# Patient Record
Sex: FEMALE | Race: WHITE | Employment: FULL TIME | URBAN - METROPOLITAN AREA
[De-identification: names, ages, dates, MRNs, and addresses within clinical notes are randomized per-mention and may not be internally consistent; named-entity substitution may affect disease eponyms.]

---

## 2019-03-11 ENCOUNTER — APPOINTMENT (OUTPATIENT)
Dept: CT IMAGING | Age: 59
DRG: 552 | End: 2019-03-11
Payer: COMMERCIAL

## 2019-03-11 ENCOUNTER — HOSPITAL ENCOUNTER (INPATIENT)
Age: 59
LOS: 2 days | Discharge: HOME | DRG: 552 | End: 2019-03-13
Attending: EMERGENCY MEDICINE | Admitting: INTERNAL MEDICINE
Payer: COMMERCIAL

## 2019-03-11 ENCOUNTER — APPOINTMENT (OUTPATIENT)
Dept: MRI IMAGING | Age: 59
DRG: 552 | End: 2019-03-11
Payer: COMMERCIAL

## 2019-03-11 ENCOUNTER — APPOINTMENT (OUTPATIENT)
Dept: GENERAL RADIOLOGY | Age: 59
DRG: 552 | End: 2019-03-11
Payer: COMMERCIAL

## 2019-03-11 DIAGNOSIS — S12.100A TRAUMATIC CLOSED FRACTURE OF C2 VERTEBRA WITH MINIMAL DISPLACEMENT, INITIAL ENCOUNTER (HCC): ICD-10-CM

## 2019-03-11 DIAGNOSIS — S09.90XA INJURY OF HEAD, INITIAL ENCOUNTER: ICD-10-CM

## 2019-03-11 DIAGNOSIS — S62.101A RIGHT WRIST FRACTURE, CLOSED, INITIAL ENCOUNTER: Primary | ICD-10-CM

## 2019-03-11 DIAGNOSIS — S00.81XA FOREHEAD ABRASION, INITIAL ENCOUNTER: ICD-10-CM

## 2019-03-11 PROBLEM — S12.101A CLOSED NONDISPLACED FRACTURE OF SECOND CERVICAL VERTEBRA (HCC): Status: ACTIVE | Noted: 2019-03-11

## 2019-03-11 LAB
ANION GAP SERPL CALCULATED.3IONS-SCNC: 11 MMOL/L (ref 3–16)
BUN BLDV-MCNC: 14 MG/DL (ref 7–20)
CALCIUM SERPL-MCNC: 9.8 MG/DL (ref 8.3–10.6)
CHLORIDE BLD-SCNC: 98 MMOL/L (ref 99–110)
CO2: 29 MMOL/L (ref 21–32)
CREAT SERPL-MCNC: 0.6 MG/DL (ref 0.6–1.1)
GFR AFRICAN AMERICAN: >60
GFR NON-AFRICAN AMERICAN: >60
GLUCOSE BLD-MCNC: 122 MG/DL (ref 70–99)
POTASSIUM SERPL-SCNC: 3.9 MMOL/L (ref 3.5–5.1)
SODIUM BLD-SCNC: 138 MMOL/L (ref 136–145)

## 2019-03-11 PROCEDURE — 72141 MRI NECK SPINE W/O DYE: CPT

## 2019-03-11 PROCEDURE — 6360000004 HC RX CONTRAST MEDICATION: Performed by: EMERGENCY MEDICINE

## 2019-03-11 PROCEDURE — 99285 EMERGENCY DEPT VISIT HI MDM: CPT

## 2019-03-11 PROCEDURE — 80048 BASIC METABOLIC PNL TOTAL CA: CPT

## 2019-03-11 PROCEDURE — 6370000000 HC RX 637 (ALT 250 FOR IP): Performed by: FAMILY MEDICINE

## 2019-03-11 PROCEDURE — 73110 X-RAY EXAM OF WRIST: CPT

## 2019-03-11 PROCEDURE — 72125 CT NECK SPINE W/O DYE: CPT

## 2019-03-11 PROCEDURE — 90471 IMMUNIZATION ADMIN: CPT | Performed by: EMERGENCY MEDICINE

## 2019-03-11 PROCEDURE — 70450 CT HEAD/BRAIN W/O DYE: CPT

## 2019-03-11 PROCEDURE — 6360000002 HC RX W HCPCS: Performed by: INTERNAL MEDICINE

## 2019-03-11 PROCEDURE — 2580000003 HC RX 258: Performed by: NURSE PRACTITIONER

## 2019-03-11 PROCEDURE — 6370000000 HC RX 637 (ALT 250 FOR IP): Performed by: EMERGENCY MEDICINE

## 2019-03-11 PROCEDURE — 96375 TX/PRO/DX INJ NEW DRUG ADDON: CPT

## 2019-03-11 PROCEDURE — 90715 TDAP VACCINE 7 YRS/> IM: CPT | Performed by: EMERGENCY MEDICINE

## 2019-03-11 PROCEDURE — 6370000000 HC RX 637 (ALT 250 FOR IP): Performed by: INTERNAL MEDICINE

## 2019-03-11 PROCEDURE — 6370000000 HC RX 637 (ALT 250 FOR IP): Performed by: NURSE PRACTITIONER

## 2019-03-11 PROCEDURE — 1200000000 HC SEMI PRIVATE

## 2019-03-11 PROCEDURE — 70498 CT ANGIOGRAPHY NECK: CPT

## 2019-03-11 PROCEDURE — 2580000003 HC RX 258: Performed by: INTERNAL MEDICINE

## 2019-03-11 PROCEDURE — 4500000025 HC ED LEVEL 5 PROCEDURE

## 2019-03-11 PROCEDURE — 6360000002 HC RX W HCPCS: Performed by: NURSE PRACTITIONER

## 2019-03-11 PROCEDURE — 6360000002 HC RX W HCPCS: Performed by: EMERGENCY MEDICINE

## 2019-03-11 PROCEDURE — 96365 THER/PROPH/DIAG IV INF INIT: CPT

## 2019-03-11 PROCEDURE — 70496 CT ANGIOGRAPHY HEAD: CPT

## 2019-03-11 RX ORDER — IBUPROFEN 400 MG/1
800 TABLET ORAL ONCE
Status: COMPLETED | OUTPATIENT
Start: 2019-03-11 | End: 2019-03-11

## 2019-03-11 RX ORDER — OXYCODONE HYDROCHLORIDE AND ACETAMINOPHEN 5; 325 MG/1; MG/1
1 TABLET ORAL EVERY 4 HOURS PRN
Status: DISCONTINUED | OUTPATIENT
Start: 2019-03-11 | End: 2019-03-11

## 2019-03-11 RX ORDER — ONDANSETRON 2 MG/ML
4 INJECTION INTRAMUSCULAR; INTRAVENOUS ONCE
Status: COMPLETED | OUTPATIENT
Start: 2019-03-11 | End: 2019-03-11

## 2019-03-11 RX ORDER — LABETALOL HYDROCHLORIDE 5 MG/ML
10 INJECTION, SOLUTION INTRAVENOUS EVERY 4 HOURS PRN
Status: DISCONTINUED | OUTPATIENT
Start: 2019-03-11 | End: 2019-03-13 | Stop reason: HOSPADM

## 2019-03-11 RX ORDER — UREA 10 %
3 LOTION (ML) TOPICAL NIGHTLY PRN
Status: DISCONTINUED | OUTPATIENT
Start: 2019-03-11 | End: 2019-03-13 | Stop reason: HOSPADM

## 2019-03-11 RX ORDER — SODIUM CHLORIDE 0.9 % (FLUSH) 0.9 %
10 SYRINGE (ML) INJECTION PRN
Status: DISCONTINUED | OUTPATIENT
Start: 2019-03-11 | End: 2019-03-13 | Stop reason: HOSPADM

## 2019-03-11 RX ORDER — ACETAMINOPHEN 325 MG/1
650 TABLET ORAL EVERY 4 HOURS PRN
Status: DISCONTINUED | OUTPATIENT
Start: 2019-03-11 | End: 2019-03-13 | Stop reason: HOSPADM

## 2019-03-11 RX ORDER — SODIUM CHLORIDE 0.9 % (FLUSH) 0.9 %
10 SYRINGE (ML) INJECTION EVERY 12 HOURS SCHEDULED
Status: DISCONTINUED | OUTPATIENT
Start: 2019-03-11 | End: 2019-03-13 | Stop reason: HOSPADM

## 2019-03-11 RX ORDER — SODIUM CHLORIDE 9 MG/ML
INJECTION, SOLUTION INTRAVENOUS CONTINUOUS
Status: DISCONTINUED | OUTPATIENT
Start: 2019-03-11 | End: 2019-03-12

## 2019-03-11 RX ORDER — MORPHINE SULFATE 2 MG/ML
4 INJECTION, SOLUTION INTRAMUSCULAR; INTRAVENOUS EVERY 4 HOURS PRN
Status: DISCONTINUED | OUTPATIENT
Start: 2019-03-11 | End: 2019-03-13 | Stop reason: HOSPADM

## 2019-03-11 RX ORDER — MORPHINE SULFATE 4 MG/ML
4 INJECTION, SOLUTION INTRAMUSCULAR; INTRAVENOUS ONCE
Status: COMPLETED | OUTPATIENT
Start: 2019-03-11 | End: 2019-03-11

## 2019-03-11 RX ORDER — ONDANSETRON 2 MG/ML
4 INJECTION INTRAMUSCULAR; INTRAVENOUS EVERY 6 HOURS PRN
Status: DISCONTINUED | OUTPATIENT
Start: 2019-03-11 | End: 2019-03-13 | Stop reason: HOSPADM

## 2019-03-11 RX ORDER — METHOCARBAMOL 500 MG/1
1000 TABLET, FILM COATED ORAL 4 TIMES DAILY
Status: DISCONTINUED | OUTPATIENT
Start: 2019-03-11 | End: 2019-03-13 | Stop reason: HOSPADM

## 2019-03-11 RX ORDER — OXYCODONE HYDROCHLORIDE AND ACETAMINOPHEN 5; 325 MG/1; MG/1
1 TABLET ORAL EVERY 4 HOURS PRN
Status: DISCONTINUED | OUTPATIENT
Start: 2019-03-11 | End: 2019-03-13 | Stop reason: HOSPADM

## 2019-03-11 RX ORDER — DULOXETIN HYDROCHLORIDE 20 MG/1
20 CAPSULE, DELAYED RELEASE ORAL DAILY
COMMUNITY

## 2019-03-11 RX ORDER — M-VIT,TX,IRON,MINS/CALC/FOLIC 27MG-0.4MG
1 TABLET ORAL DAILY
COMMUNITY

## 2019-03-11 RX ORDER — OXYCODONE HYDROCHLORIDE AND ACETAMINOPHEN 5; 325 MG/1; MG/1
2 TABLET ORAL EVERY 4 HOURS PRN
Status: DISCONTINUED | OUTPATIENT
Start: 2019-03-11 | End: 2019-03-13 | Stop reason: HOSPADM

## 2019-03-11 RX ADMIN — IBUPROFEN 800 MG: 400 TABLET, FILM COATED ORAL at 11:53

## 2019-03-11 RX ADMIN — ONDANSETRON 4 MG: 2 INJECTION INTRAMUSCULAR; INTRAVENOUS at 12:52

## 2019-03-11 RX ADMIN — MORPHINE SULFATE 4 MG: 2 INJECTION, SOLUTION INTRAMUSCULAR; INTRAVENOUS at 18:28

## 2019-03-11 RX ADMIN — SODIUM CHLORIDE: 9 INJECTION, SOLUTION INTRAVENOUS at 18:58

## 2019-03-11 RX ADMIN — METHOCARBAMOL TABLETS 1000 MG: 500 TABLET, COATED ORAL at 21:11

## 2019-03-11 RX ADMIN — MORPHINE SULFATE 4 MG: 4 INJECTION, SOLUTION INTRAMUSCULAR; INTRAVENOUS at 12:52

## 2019-03-11 RX ADMIN — OXYCODONE AND ACETAMINOPHEN 1 TABLET: 5; 325 TABLET ORAL at 15:49

## 2019-03-11 RX ADMIN — OXYCODONE AND ACETAMINOPHEN 1 TABLET: 5; 325 TABLET ORAL at 22:46

## 2019-03-11 RX ADMIN — OXYCODONE HYDROCHLORIDE AND ACETAMINOPHEN 1 TABLET: 5; 325 TABLET ORAL at 23:12

## 2019-03-11 RX ADMIN — IOPAMIDOL 80 ML: 755 INJECTION, SOLUTION INTRAVENOUS at 14:12

## 2019-03-11 RX ADMIN — METHOCARBAMOL 1000 MG: 100 INJECTION, SOLUTION INTRAMUSCULAR; INTRAVENOUS at 14:29

## 2019-03-11 RX ADMIN — ONDANSETRON 4 MG: 2 INJECTION INTRAMUSCULAR; INTRAVENOUS at 18:28

## 2019-03-11 ASSESSMENT — PAIN DESCRIPTION - ORIENTATION
ORIENTATION: MID
ORIENTATION: RIGHT
ORIENTATION: MID

## 2019-03-11 ASSESSMENT — PAIN SCALES - GENERAL
PAINLEVEL_OUTOF10: 3
PAINLEVEL_OUTOF10: 10
PAINLEVEL_OUTOF10: 10
PAINLEVEL_OUTOF10: 7
PAINLEVEL_OUTOF10: 10
PAINLEVEL_OUTOF10: 3
PAINLEVEL_OUTOF10: 7
PAINLEVEL_OUTOF10: 10
PAINLEVEL_OUTOF10: 6
PAINLEVEL_OUTOF10: 5

## 2019-03-11 ASSESSMENT — PAIN DESCRIPTION - FREQUENCY
FREQUENCY: CONTINUOUS

## 2019-03-11 ASSESSMENT — PAIN DESCRIPTION - LOCATION
LOCATION: NECK

## 2019-03-11 ASSESSMENT — PAIN DESCRIPTION - PAIN TYPE
TYPE: ACUTE PAIN

## 2019-03-11 ASSESSMENT — PAIN DESCRIPTION - ONSET
ONSET: ON-GOING
ONSET: SUDDEN
ONSET: ON-GOING
ONSET: ON-GOING

## 2019-03-11 ASSESSMENT — PAIN DESCRIPTION - DESCRIPTORS
DESCRIPTORS: ACHING
DESCRIPTORS: ACHING;CONSTANT
DESCRIPTORS: CONSTANT
DESCRIPTORS: ACHING;CONSTANT

## 2019-03-11 ASSESSMENT — PAIN DESCRIPTION - PROGRESSION
CLINICAL_PROGRESSION: NOT CHANGED
CLINICAL_PROGRESSION: NOT CHANGED
CLINICAL_PROGRESSION: GRADUALLY WORSENING
CLINICAL_PROGRESSION: GRADUALLY WORSENING

## 2019-03-11 ASSESSMENT — PAIN DESCRIPTION - DIRECTION: RADIATING_TOWARDS: HEAD

## 2019-03-11 ASSESSMENT — ENCOUNTER SYMPTOMS
RESPIRATORY NEGATIVE: 1
GASTROINTESTINAL NEGATIVE: 1

## 2019-03-12 ENCOUNTER — APPOINTMENT (OUTPATIENT)
Dept: GENERAL RADIOLOGY | Age: 59
DRG: 552 | End: 2019-03-12
Payer: COMMERCIAL

## 2019-03-12 LAB
ANION GAP SERPL CALCULATED.3IONS-SCNC: 13 MMOL/L (ref 3–16)
BASOPHILS ABSOLUTE: 0 K/UL (ref 0–0.2)
BASOPHILS RELATIVE PERCENT: 0.2 %
BUN BLDV-MCNC: 13 MG/DL (ref 7–20)
CALCIUM SERPL-MCNC: 9 MG/DL (ref 8.3–10.6)
CHLORIDE BLD-SCNC: 102 MMOL/L (ref 99–110)
CO2: 29 MMOL/L (ref 21–32)
CREAT SERPL-MCNC: 0.6 MG/DL (ref 0.6–1.1)
EOSINOPHILS ABSOLUTE: 0 K/UL (ref 0–0.6)
EOSINOPHILS RELATIVE PERCENT: 0.3 %
GFR AFRICAN AMERICAN: >60
GFR NON-AFRICAN AMERICAN: >60
GLUCOSE BLD-MCNC: 128 MG/DL (ref 70–99)
HCT VFR BLD CALC: 38.4 % (ref 36–48)
HEMOGLOBIN: 13 G/DL (ref 12–16)
LYMPHOCYTES ABSOLUTE: 1.4 K/UL (ref 1–5.1)
LYMPHOCYTES RELATIVE PERCENT: 19.5 %
MCH RBC QN AUTO: 32.9 PG (ref 26–34)
MCHC RBC AUTO-ENTMCNC: 33.9 G/DL (ref 31–36)
MCV RBC AUTO: 96.8 FL (ref 80–100)
MONOCYTES ABSOLUTE: 0.7 K/UL (ref 0–1.3)
MONOCYTES RELATIVE PERCENT: 9.3 %
NEUTROPHILS ABSOLUTE: 5.2 K/UL (ref 1.7–7.7)
NEUTROPHILS RELATIVE PERCENT: 70.7 %
PDW BLD-RTO: 13.2 % (ref 12.4–15.4)
PLATELET # BLD: 163 K/UL (ref 135–450)
PMV BLD AUTO: 9.6 FL (ref 5–10.5)
POTASSIUM REFLEX MAGNESIUM: 4.4 MMOL/L (ref 3.5–5.1)
RBC # BLD: 3.97 M/UL (ref 4–5.2)
SODIUM BLD-SCNC: 144 MMOL/L (ref 136–145)
WBC # BLD: 7.3 K/UL (ref 4–11)

## 2019-03-12 PROCEDURE — 6370000000 HC RX 637 (ALT 250 FOR IP): Performed by: FAMILY MEDICINE

## 2019-03-12 PROCEDURE — 6360000002 HC RX W HCPCS: Performed by: INTERNAL MEDICINE

## 2019-03-12 PROCEDURE — 80048 BASIC METABOLIC PNL TOTAL CA: CPT

## 2019-03-12 PROCEDURE — 97530 THERAPEUTIC ACTIVITIES: CPT

## 2019-03-12 PROCEDURE — 1200000000 HC SEMI PRIVATE

## 2019-03-12 PROCEDURE — 85025 COMPLETE CBC W/AUTO DIFF WBC: CPT

## 2019-03-12 PROCEDURE — 6370000000 HC RX 637 (ALT 250 FOR IP): Performed by: NURSE PRACTITIONER

## 2019-03-12 PROCEDURE — 72040 X-RAY EXAM NECK SPINE 2-3 VW: CPT

## 2019-03-12 PROCEDURE — 6370000000 HC RX 637 (ALT 250 FOR IP): Performed by: INTERNAL MEDICINE

## 2019-03-12 PROCEDURE — 36415 COLL VENOUS BLD VENIPUNCTURE: CPT

## 2019-03-12 PROCEDURE — 97535 SELF CARE MNGMENT TRAINING: CPT

## 2019-03-12 PROCEDURE — 97166 OT EVAL MOD COMPLEX 45 MIN: CPT

## 2019-03-12 PROCEDURE — 97116 GAIT TRAINING THERAPY: CPT

## 2019-03-12 PROCEDURE — 6360000002 HC RX W HCPCS: Performed by: NURSE PRACTITIONER

## 2019-03-12 PROCEDURE — 97162 PT EVAL MOD COMPLEX 30 MIN: CPT

## 2019-03-12 PROCEDURE — 2580000003 HC RX 258: Performed by: INTERNAL MEDICINE

## 2019-03-12 RX ORDER — PSEUDOEPHEDRINE HYDROCHLORIDE 30 MG/1
60 TABLET ORAL EVERY 6 HOURS PRN
Status: DISCONTINUED | OUTPATIENT
Start: 2019-03-12 | End: 2019-03-13 | Stop reason: HOSPADM

## 2019-03-12 RX ORDER — SENNA AND DOCUSATE SODIUM 50; 8.6 MG/1; MG/1
2 TABLET, FILM COATED ORAL 2 TIMES DAILY
Status: DISCONTINUED | OUTPATIENT
Start: 2019-03-12 | End: 2019-03-13 | Stop reason: HOSPADM

## 2019-03-12 RX ORDER — DULOXETIN HYDROCHLORIDE 20 MG/1
20 CAPSULE, DELAYED RELEASE ORAL DAILY
Status: DISCONTINUED | OUTPATIENT
Start: 2019-03-12 | End: 2019-03-13 | Stop reason: HOSPADM

## 2019-03-12 RX ORDER — M-VIT,TX,IRON,MINS/CALC/FOLIC 27MG-0.4MG
1 TABLET ORAL DAILY
Status: DISCONTINUED | OUTPATIENT
Start: 2019-03-12 | End: 2019-03-13 | Stop reason: HOSPADM

## 2019-03-12 RX ORDER — HYDROXYZINE HYDROCHLORIDE 10 MG/1
10 TABLET, FILM COATED ORAL 3 TIMES DAILY PRN
Status: DISCONTINUED | OUTPATIENT
Start: 2019-03-12 | End: 2019-03-13 | Stop reason: HOSPADM

## 2019-03-12 RX ORDER — LORAZEPAM 2 MG/ML
1 INJECTION INTRAMUSCULAR ONCE
Status: COMPLETED | OUTPATIENT
Start: 2019-03-12 | End: 2019-03-12

## 2019-03-12 RX ADMIN — Medication 10 ML: at 20:18

## 2019-03-12 RX ADMIN — METHOCARBAMOL TABLETS 1000 MG: 500 TABLET, COATED ORAL at 17:09

## 2019-03-12 RX ADMIN — VITAMIN D, TAB 1000IU (100/BT) 1000 UNITS: 25 TAB at 08:51

## 2019-03-12 RX ADMIN — OXYCODONE AND ACETAMINOPHEN 2 TABLET: 5; 325 TABLET ORAL at 06:11

## 2019-03-12 RX ADMIN — Medication 12.5 MG: at 08:51

## 2019-03-12 RX ADMIN — DULOXETINE HYDROCHLORIDE 20 MG: 20 CAPSULE, DELAYED RELEASE ORAL at 08:51

## 2019-03-12 RX ADMIN — MULTIPLE VITAMINS W/ MINERALS TAB 1 TABLET: TAB at 08:58

## 2019-03-12 RX ADMIN — OXYCODONE HYDROCHLORIDE AND ACETAMINOPHEN 1 TABLET: 5; 325 TABLET ORAL at 15:13

## 2019-03-12 RX ADMIN — HYDROXYZINE HYDROCHLORIDE 10 MG: 10 TABLET ORAL at 15:13

## 2019-03-12 RX ADMIN — SENNOSIDES AND DOCUSATE SODIUM 2 TABLET: 8.6; 5 TABLET ORAL at 20:15

## 2019-03-12 RX ADMIN — OXYCODONE AND ACETAMINOPHEN 2 TABLET: 5; 325 TABLET ORAL at 19:30

## 2019-03-12 RX ADMIN — Medication 10 ML: at 09:02

## 2019-03-12 RX ADMIN — METHOCARBAMOL TABLETS 1000 MG: 500 TABLET, COATED ORAL at 08:51

## 2019-03-12 RX ADMIN — MORPHINE SULFATE 4 MG: 2 INJECTION, SOLUTION INTRAMUSCULAR; INTRAVENOUS at 01:16

## 2019-03-12 RX ADMIN — PSEUDOEPHEDRINE HCL 60 MG: 30 TABLET, FILM COATED ORAL at 17:20

## 2019-03-12 RX ADMIN — LORAZEPAM 1 MG: 2 INJECTION INTRAMUSCULAR; INTRAVENOUS at 07:01

## 2019-03-12 RX ADMIN — Medication 12.5 MG: at 20:15

## 2019-03-12 ASSESSMENT — PAIN DESCRIPTION - LOCATION
LOCATION: NECK;BACK
LOCATION: NECK

## 2019-03-12 ASSESSMENT — PAIN DESCRIPTION - DESCRIPTORS
DESCRIPTORS: ACHING

## 2019-03-12 ASSESSMENT — PAIN SCALES - GENERAL
PAINLEVEL_OUTOF10: 0
PAINLEVEL_OUTOF10: 4
PAINLEVEL_OUTOF10: 3
PAINLEVEL_OUTOF10: 10
PAINLEVEL_OUTOF10: 5
PAINLEVEL_OUTOF10: 7
PAINLEVEL_OUTOF10: 7

## 2019-03-12 ASSESSMENT — PAIN DESCRIPTION - ONSET
ONSET: ON-GOING

## 2019-03-12 ASSESSMENT — PAIN - FUNCTIONAL ASSESSMENT: PAIN_FUNCTIONAL_ASSESSMENT: ACTIVITIES ARE NOT PREVENTED

## 2019-03-12 ASSESSMENT — PAIN DESCRIPTION - ORIENTATION
ORIENTATION: POSTERIOR
ORIENTATION: POSTERIOR;UPPER
ORIENTATION: POSTERIOR
ORIENTATION: MID

## 2019-03-12 ASSESSMENT — PAIN DESCRIPTION - PAIN TYPE
TYPE: ACUTE PAIN

## 2019-03-12 ASSESSMENT — PAIN DESCRIPTION - FREQUENCY
FREQUENCY: CONTINUOUS

## 2019-03-12 ASSESSMENT — PAIN DESCRIPTION - PROGRESSION
CLINICAL_PROGRESSION: GRADUALLY WORSENING
CLINICAL_PROGRESSION: NOT CHANGED
CLINICAL_PROGRESSION: GRADUALLY WORSENING

## 2019-03-13 VITALS
HEIGHT: 64 IN | DIASTOLIC BLOOD PRESSURE: 69 MMHG | TEMPERATURE: 98.8 F | SYSTOLIC BLOOD PRESSURE: 113 MMHG | OXYGEN SATURATION: 92 % | WEIGHT: 174.38 LBS | HEART RATE: 80 BPM | BODY MASS INDEX: 29.77 KG/M2 | RESPIRATION RATE: 16 BRPM

## 2019-03-13 PROCEDURE — 6370000000 HC RX 637 (ALT 250 FOR IP): Performed by: NURSE PRACTITIONER

## 2019-03-13 PROCEDURE — 97116 GAIT TRAINING THERAPY: CPT

## 2019-03-13 PROCEDURE — 97530 THERAPEUTIC ACTIVITIES: CPT

## 2019-03-13 PROCEDURE — 97535 SELF CARE MNGMENT TRAINING: CPT

## 2019-03-13 PROCEDURE — 97110 THERAPEUTIC EXERCISES: CPT

## 2019-03-13 PROCEDURE — 6370000000 HC RX 637 (ALT 250 FOR IP): Performed by: FAMILY MEDICINE

## 2019-03-13 PROCEDURE — 6370000000 HC RX 637 (ALT 250 FOR IP): Performed by: INTERNAL MEDICINE

## 2019-03-13 PROCEDURE — 2580000003 HC RX 258: Performed by: INTERNAL MEDICINE

## 2019-03-13 RX ORDER — OXYCODONE HYDROCHLORIDE AND ACETAMINOPHEN 5; 325 MG/1; MG/1
TABLET ORAL
Qty: 30 TABLET | Refills: 0 | Status: SHIPPED | OUTPATIENT
Start: 2019-03-13 | End: 2019-03-13

## 2019-03-13 RX ORDER — METHOCARBAMOL 500 MG/1
1000 TABLET, FILM COATED ORAL 4 TIMES DAILY
Qty: 80 TABLET | Refills: 0 | Status: SHIPPED | OUTPATIENT
Start: 2019-03-13 | End: 2019-03-23

## 2019-03-13 RX ORDER — OXYCODONE HYDROCHLORIDE AND ACETAMINOPHEN 5; 325 MG/1; MG/1
TABLET ORAL
Qty: 30 TABLET | Refills: 0 | Status: SHIPPED | OUTPATIENT
Start: 2019-03-13 | End: 2019-03-20

## 2019-03-13 RX ADMIN — ACETAMINOPHEN 650 MG: 325 TABLET ORAL at 07:59

## 2019-03-13 RX ADMIN — SENNOSIDES AND DOCUSATE SODIUM 2 TABLET: 8.6; 5 TABLET ORAL at 07:52

## 2019-03-13 RX ADMIN — VITAMIN D, TAB 1000IU (100/BT) 1000 UNITS: 25 TAB at 07:53

## 2019-03-13 RX ADMIN — METHOCARBAMOL TABLETS 1000 MG: 500 TABLET, COATED ORAL at 07:53

## 2019-03-13 RX ADMIN — OXYCODONE HYDROCHLORIDE AND ACETAMINOPHEN 1 TABLET: 5; 325 TABLET ORAL at 13:10

## 2019-03-13 RX ADMIN — MULTIPLE VITAMINS W/ MINERALS TAB 1 TABLET: TAB at 07:52

## 2019-03-13 RX ADMIN — Medication 12.5 MG: at 07:52

## 2019-03-13 RX ADMIN — PSEUDOEPHEDRINE HCL 60 MG: 30 TABLET, FILM COATED ORAL at 07:27

## 2019-03-13 RX ADMIN — ACETAMINOPHEN 650 MG: 325 TABLET ORAL at 12:13

## 2019-03-13 RX ADMIN — METHOCARBAMOL TABLETS 1000 MG: 500 TABLET, COATED ORAL at 13:40

## 2019-03-13 RX ADMIN — Medication 10 ML: at 10:28

## 2019-03-13 RX ADMIN — DULOXETINE HYDROCHLORIDE 20 MG: 20 CAPSULE, DELAYED RELEASE ORAL at 07:53

## 2019-03-13 ASSESSMENT — PAIN SCALES - GENERAL
PAINLEVEL_OUTOF10: 0
PAINLEVEL_OUTOF10: 3
PAINLEVEL_OUTOF10: 3
PAINLEVEL_OUTOF10: 4

## 2019-03-13 ASSESSMENT — PAIN DESCRIPTION - LOCATION: LOCATION: HEAD

## 2019-03-13 ASSESSMENT — PAIN DESCRIPTION - PAIN TYPE: TYPE: ACUTE PAIN

## 2019-03-13 ASSESSMENT — PAIN DESCRIPTION - DESCRIPTORS: DESCRIPTORS: ACHING

## 2019-03-13 ASSESSMENT — PAIN DESCRIPTION - FREQUENCY: FREQUENCY: CONTINUOUS

## 2019-03-13 ASSESSMENT — PAIN DESCRIPTION - ONSET: ONSET: ON-GOING

## 2019-03-18 ENCOUNTER — TELEPHONE (OUTPATIENT)
Dept: ORTHOPEDIC SURGERY | Age: 59
End: 2019-03-18

## 2019-03-20 ENCOUNTER — OFFICE VISIT (OUTPATIENT)
Dept: ORTHOPEDIC SURGERY | Age: 59
End: 2019-03-20
Payer: COMMERCIAL

## 2019-03-20 VITALS
WEIGHT: 174.38 LBS | DIASTOLIC BLOOD PRESSURE: 78 MMHG | HEART RATE: 76 BPM | BODY MASS INDEX: 29.77 KG/M2 | SYSTOLIC BLOOD PRESSURE: 128 MMHG | HEIGHT: 64 IN

## 2019-03-20 DIAGNOSIS — S62.101A CLOSED FRACTURE OF RIGHT WRIST, INITIAL ENCOUNTER: ICD-10-CM

## 2019-03-20 DIAGNOSIS — M25.531 RIGHT WRIST PAIN: Primary | ICD-10-CM

## 2019-03-20 PROCEDURE — 25600 CLTX DST RDL FX/EPHYS SEP WO: CPT | Performed by: ORTHOPAEDIC SURGERY

## 2019-03-20 PROCEDURE — 99203 OFFICE O/P NEW LOW 30 MIN: CPT | Performed by: ORTHOPAEDIC SURGERY

## 2019-03-20 PROCEDURE — L3660 SO 8 AB RSTR CAN/WEB PRE OTS: HCPCS | Performed by: ORTHOPAEDIC SURGERY

## 2019-03-27 ENCOUNTER — OFFICE VISIT (OUTPATIENT)
Dept: ORTHOPEDIC SURGERY | Age: 59
End: 2019-03-27
Payer: COMMERCIAL

## 2019-03-27 DIAGNOSIS — S62.101A CLOSED FRACTURE OF RIGHT WRIST, INITIAL ENCOUNTER: Primary | ICD-10-CM

## 2019-03-27 DIAGNOSIS — S52.571A OTHER CLOSED INTRA-ARTICULAR FRACTURE OF DISTAL END OF RIGHT RADIUS, INITIAL ENCOUNTER: ICD-10-CM

## 2019-03-27 PROCEDURE — 99213 OFFICE O/P EST LOW 20 MIN: CPT | Performed by: ORTHOPAEDIC SURGERY

## 2019-03-28 ENCOUNTER — TELEPHONE (OUTPATIENT)
Dept: ORTHOPEDIC SURGERY | Age: 59
End: 2019-03-28

## 2019-03-28 PROBLEM — S52.501A CLOSED FRACTURE OF RIGHT DISTAL RADIUS: Status: ACTIVE | Noted: 2019-03-28

## 2019-04-03 ENCOUNTER — OFFICE VISIT (OUTPATIENT)
Dept: ORTHOPEDIC SURGERY | Age: 59
End: 2019-04-03
Payer: COMMERCIAL

## 2019-04-03 VITALS — WEIGHT: 174.38 LBS | BODY MASS INDEX: 29.77 KG/M2 | HEIGHT: 64 IN

## 2019-04-03 DIAGNOSIS — S62.101A CLOSED FRACTURE OF RIGHT WRIST, INITIAL ENCOUNTER: Primary | ICD-10-CM

## 2019-04-03 PROCEDURE — 99213 OFFICE O/P EST LOW 20 MIN: CPT | Performed by: ORTHOPAEDIC SURGERY

## 2019-04-03 NOTE — PROGRESS NOTES
Assessment:  62-year-old female presenting with history of right wrist injury after fall while playing kickball  1. Right closed intra-articular distal radius fracture    Treatment Plan: We discussed continued nonoperative treatment. The patient understands the risks of nonoperative treatment including interval displacement or stiffness and arthritis in the future secondary to the nature of the injury. I do think that she likely has interval healing and we will keep her cast in place for now. We'll plan to follow-up in 3 weeks for repeat evaluation, removal of cast and repeat imaging of the cast. Thereafter we will decide whether we will continue with cast or convert to a brace and begin some gentle range of motion. The patient is in agreement with the plan today    No follow-ups on file. History of Present Illness  Es Rosas is a 63 y.o. female here today for a follow-up for her right wrist intra-articular distal radius fracture  She was placed into a short arm cast last week, demonstrating appropriate alignment of her fracture to consider nonoperative treatment. Date of injury: 3/11/2019  She is now approximately just over 3 weeks status post injury  We have been treating her conservatively with cast. She is here today for repeat check and serial imaging. No new complaints, her cast is been fitting appropriately. No new numbness or tingling or weakness in her fingers and pain has been manageable with Tylenol      Review of Systems  Pertinent items are noted in HPI  Review of systems reviewed from Patient History Form dated on 3/11/19 and available in the patient's chart under the Media tab. Vital Signs  There were no vitals filed for this visit. Physical Exam  Constitutional:  Patient is well-nourished and demonstrates normal hygiene. Mental Status:  Patient is alert and oriented to person, place and time. Skin:  Intact, no rashes or lesions.      Right wrist/right upper extremity:     Inspection:   cast in place with no evidence of material breakdown, no proximal or distal skin lesions  Normal finger tenodesis and cascade     Palpation:  deferred examination of wrist secondary to cast placement     Range of motion: Deferred formal range of motion of wrist secondary to known injury and cast placement  Patient demonstrates comfortable full composite fist and full extension of all fingers actively     Strength: 5/5 Active fdp/fds/edc, active epl/fpl     Special tests: Sensation grossly intact to light touch in median ulnar and radial nerve in the hand without deficit           Capillary refill brisk all fingers, symmetric  Gross sensation intact to light touch median/ulnar/radial nerves  Sensation intact to radial/ulnar aspect of fingertip        Radiology:    X-rays obtained and reviewed in office:  Views 3  Location right wrist  Impression unchanged alignment of distal radius fracture with evidence of intra-articular extension, no change in distal radioulnar joint alignment with overlying cast    Additional Diagnostic Test Findings:    Office Procedures:  Orders Placed This Encounter   Procedures    XR WRIST RIGHT (MIN 3 VIEWS)     Standing Status:   Future     Number of Occurrences:   1     Standing Expiration Date:   4/3/2020     Order Specific Question:   Reason for exam:     Answer:   franklin Silva MD  Orthopaedic Surgeon, Hand & Upper Extremity   [facility]    Contact Information:  Esa Arrant: [phone]  Clinical )    This dictation was performed with a verbal recognition program [facility]) and it was checked for errors. It is possible that there are still dictated errors within this office note. If so, please bring any errors to my attention for an addendum. All efforts were made to ensure that this office note is accurate.

## 2019-04-24 ENCOUNTER — OFFICE VISIT (OUTPATIENT)
Dept: ORTHOPEDIC SURGERY | Age: 59
End: 2019-04-24
Payer: COMMERCIAL

## 2019-04-24 DIAGNOSIS — M66.841 NONTRAUMATIC RUPTURE OF TENDON OF RIGHT THUMB: ICD-10-CM

## 2019-04-24 DIAGNOSIS — S62.101A CLOSED FRACTURE OF RIGHT WRIST, INITIAL ENCOUNTER: Primary | ICD-10-CM

## 2019-04-24 PROCEDURE — L3908 WHO COCK-UP NONMOLDE PRE OTS: HCPCS | Performed by: ORTHOPAEDIC SURGERY

## 2019-04-24 PROCEDURE — 99213 OFFICE O/P EST LOW 20 MIN: CPT | Performed by: ORTHOPAEDIC SURGERY

## 2019-04-24 NOTE — PROGRESS NOTES
Assessment: 73-year-old female presenting with history of right wrist injury after fall while playing kickball  1. Right closed intra-articular distal radius fracture  2. 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 However, she does have evidence of what appears to be attritional rupture of her extensor pollicis longus tendon. I did discuss with the patient today the association of this injury with closed distal radius fractures and there is no obvious new change in fracture alignment to suggest any malunion. I spoke with the patient very candidly today about options for treatment including operative intervention and I would recommend that the patient considers operative intervention to improve and maximize her function. This would include tendon transfer with extensor indices proprius to extensor pollicis longus tendon transfer for her right thumb  Nonoperative treatment would likely result in continued lack of extension actively or thumb. Risks benefits alternatives of surgery were discussed with patient including but not limited to infection bleeding damage to nerves and blood vessels need for additional procedures continued pain and weakness, stiffness, need for further mobilization and risks of anesthesia including stroke heart attack blood clot and death  After thorough discussion the patient is understanding and would like to proceed with surgical intervention for EIP to EPL tendon transfer  We will begin the process of surgery scheduling here today and plan for surgery next week.  I specifically discussed the postoperative course including immobilization followed by progressive thumb and wrist range of motion, the unlikelihood of primary repair secondary to mechanism of rupture was also discussed with patient today      No follow-ups on file. History of Present Illness  Roxanne Augustin is a 63 y. o. female here today for a follow-up for her right wrist intra-articular distal radius fracture  She was placed into a short arm cast last week, demonstrating appropriate alignment of her fracture to consider nonoperative treatment. Date of injury: 3/11/2019  She is now approximately 6 weeks status post injury  We have been treating her conservatively with casting for her intra-articular distal radius fracture. She does continue to have some soreness in her wrist but no new pain or swelling, no new numbness or tingling   She did notice approximately 1 week ago while sleeping the floor pain in her thumb and since then has had inability to extend her thumb well in the cast. She denies any other injury but has had some soreness in her thumb especially since that time      Review of Systems  Pertinent items are noted in HPI  Review of systems reviewed from Patient History Form dated on 3/11/19 and available in the patient's chart under the Media tab. Vital Signs  There were no vitals filed for this visit. Physical Exam  Constitutional:  Patient is well-nourished and demonstrates normal hygiene. Mental Status:  Patient is alert and oriented to person, place and time. Skin:  Intact, no rashes or lesions.      Right wrist/right upper extremity:     Inspection:   Cast removed, no evidence of skin necrosis or ulceration   There is mild swelling of the dorsal wrist with no obvious fluctuance  Palpation:   Minimal tenderness at dorsal distal radius, specific tenderness near Pippa's tubercle and along the 3rd dorsal compartment and extending into the thumb  No ulnar wrist tenderness     Range of motion: Full composite fist and full extension of all fingers except for thumb active extension  Wrist range of motion approximately 30° of extension and 40° of flexion with no crepitus and mild discomfort       Strength: 5/5 Active fdp/fds/edc, active FPL, 0 out of 5 active EPL with testing isolation     Special tests: Sensation grossly intact to light touch in median ulnar and radial nerve in the hand without deficit        Capillary refill brisk all fingers, symmetric  Gross sensation intact to light touch median/ulnar/radial nerves  Sensation intact to radial/ulnar aspect of fingertip        Radiology:    X-rays obtained and reviewed in office:  Views 3  Location right wrist  Impression interval healing of distal radius fracture, gapping is noted at the dorsal ulnar aspect but no evidence of intra-articular step-off and no change interval compared with prior imaging  No additional distal radial ulnar joint abnormality    Additional Diagnostic Test Findings:    Office Procedures:  Orders Placed This Encounter   Procedures    XR WRIST RIGHT (MIN 3 VIEWS)    Titan Wrist and Forearm Brace     Patient was prescribed a Danna Mendoza Titan Wrist and Forearm Brace. The right wrist will require stabilization / immobilization from this semi-rigid / rigid orthosis to improve their function. The orthosis will assist in protecting the affected area, provide functional support and facilitate healing. The patient was educated and fit by a healthcare professional with expert knowledge and specialization in brace application while under the direct supervision of the treating physician. Verbal and written instructions for the use of and application of this item were provided. They were instructed to contact the office immediately should the brace result in increased pain, decreased sensation, increased swelling or worsening of the condition.            Baltazar Oliva MD  Orthopaedic Surgeon, [address]    Contact Information:  Watson Sherman: [phone]  Clinical )    This dictation was performed with a verbal recognition program [facility]) and it

## 2019-04-25 ENCOUNTER — TELEPHONE (OUTPATIENT)
Dept: ORTHOPEDIC SURGERY | Age: 59
End: 2019-04-25

## 2019-04-25 NOTE — TELEPHONE ENCOUNTER
PATIENT CALLED WANTING TO CHANGE HER SX TO MON. 5/6 DUE TO TRANSPORTATION ISSUES. CONFIRMED SX WOULD BE CHANGED TO 5/6. PATIENT ALSO WANTED TO SPEAK WITH DR. Mikal Amador REGARDING GETTING AN MRI DONE PRIOR TO SX  & [facility]   TOLD PATIENT I WILL HAVE DR. Mikal Amador GIVE HER A CALL

## 2019-04-30 NOTE — PROGRESS NOTES
[facility] / [facility] ([facility]) [address], [address]    Acknowledgment of Informed Consent for Surgical or Medical Procedure and Sedation  I agree to allow doctor(s) [facility] and his/her associates or assistants, including residents and/or other qualified medical practitioner to perform the following medical treatment or procedure and to administer or direct the administration of sedation as necessary:  Procedure(s): RIGHT HAND EXTENSOR INDICIS PROPRUIS (EIP) TENDON TRANSFER TO EXTENSOR POLLICIS LONGUS, ANY OTHER INDICATED PROCEDURES  My doctor has explained the following regarding the proposed procedure:   the explanation of the procedure   the benefits of the procedure   the potential problems that might occur during recuperation   the risks and side effects of the procedure which could include but are not limited to severe blood loss, infection, stroke or death   the benefits, risks and side effect of alternative procedures including the consequences of declining this procedure or any alternative procedures   the likelihood of achieving satisfactory results. I acknowledge no guarantee or assurance has been made to me regarding the results. I understand that during the course of this treatment/procedure, unforeseen conditions can occur which require an additional or different procedure. I agree to allow my physician or assistants to perform such extension of the original procedure as they may find necessary. I understand that sedation will often result in temporary impairment of memory and fine motor skills and that sedation can occasionally progress to a state of deep sedation or general anesthesia. I understand the risks of anesthesia for surgery include, but are not limited to, sore throat, hoarseness, injury to face, mouth, or teeth; nausea; headache; injury to blood vessels or nerves; death, brain damage, or paralysis.     I understand that if I have a Limitation of Treatment order in effect during my hospitalization, the order may or may not be in effect during this procedure. I give my doctor permission to give me blood or blood products. I understand that there are risks with receiving blood such as hepatitis, AIDS, fever, or allergic reaction. I acknowledge that the risks, benefits, and alternatives of this treatment have been explained to me and that no express or implied warranty has been given by the hospital, any blood bank, or any person or entity as to the blood or blood components transfused. At the discretion of my doctor, I agree to allow observers, equipment/product representatives and allow photographing, and/or televising of the procedure, provided my name or identity is maintained confidentially. I agree the hospital may dispose of or use for scientific or educational purposes any tissue, fluid, or body parts which may be removed.     ________________________________Date________Time______ am/pm  ([facility])  Patient or Signature of Closest Relative or Legal Guardian    ________________________________Date________Time______am/pm      Page 1 of  1  Witness

## 2019-05-02 NOTE — PROGRESS NOTES
[address]                          Date of Procedure 5/6/19 Time of [id number]    PRIOR TO PROCEDURE DATE:  1. Please follow any guidelines/instructions prior to your procedure as advised by your surgeon. 2. Arrange for someone to drive you home and be with you for the first 24 hours after discharge for your safety after your procedure for which you received sedation. Ensure it is someone we can share information with regarding your discharge. 3. You must contact your surgeon for instructions IF:   You are taking any blood thinners, aspirin, anti-inflammatory or vitamin E.   There is a change in your physical condition such as a cold, fever, rash, cuts, sores or any other infection, especially near your surgical site. 4. Do not drink alcohol the day before or day of your procedure. 5. A Pre-op History and Physical for surgery MUST be completed by your Physician or Urgent Care within 30 days of your procedure date. Please bring a copy with you on the day of your procedure and along with any other testing performed. THE DAY OF YOUR PROCEDURE:  1. Follow instructions for ARRIVAL TIME as DIRECTED BY YOUR SURGEON. If your surgeon does not give you a specific arrival time, please arrive at [address]  2. Enter the MAIN entrance from [employer] and follow the signs to the free [employer] or [employer] parking (offered free of charge 6am-5pm). 3. Enter the Main Entrance of the hospital (do not enter from the lower level of the parking garage). Upon entrance, check in with the  at the main desk on your left. If no one is available at the desk, proceed into the [facility] Waiting Room and go through the door directly into the [facility]. There is a Check-in desk ACROSS from Room 5 (marked with a sign hanging from the ceiling). The phone number for the surgery center is [phone].     4. Please call [phone] option #2 option #2 if you have not been preregistered yet. On the day of your procedure bring your insurance card and photo ID. You will be registered at your bedside once brought back to your room. 5. DO NOT EAT ANYTHING eight hours prior to surgery. May have 8 ounces of water 4 hours prior to surgery. 6. MEDICATIONS    Take the following medications with a SMALL sip of water: METOPROLOL   Use your usual dose of inhalers the morning of surgery. BRING your rescue inhaler with you to hospital.    Anesthesia does NOT want you to take insulin the morning of surgery. They will control your blood sugar while you are at the hospital. Please contact your ordering physician for instructions regarding your insulin the night before your procedure. If you have an insulin pump, please keep it set on basal rate. 7. Do not swallow water when brushing teeth. No gum, candy, mints or ice chips. Refrain from smoking or at least decrease the amount. 8. Dress in loose, comfortable clothing appropriate for redressing after your procedure. Do not wear jewelry (including body piercings), make-up (especially NO eye make-up), fingernail polish (NO toenail polish if foot/leg surgery), lotion, powders or metal hairclips. 9. Dentures, glasses, or contacts will need to be removed before your procedure. Bring cases for your glasses, contacts, dentures, or hearing aids to protect them while you are in surgery. 10. If you use a CPAP, please bring it with you on the day of your procedure. 11. We recommend that valuable personal  belongings such as cash, cell phones, e-tablets or jewelry, be left at home during your stay. The hospital will not be responsible for valuables that are not secured in the hospital safe. However, if your insurance requires a co-pay, you may want to bring a method of payment, i.e. Check or credit card, if you wish to pay your co-pay the day of surgery.       12. If you are to stay overnight, you may bring a patterns. Nausea, headache, muscle aches, or sore throat may also occur after anesthesia. Your nurse will help you manage these potential side effects. 2. For comfort and safety, arrange to have someone at home with you for the first 24 hours after discharge. 3. You and your family will be given written instructions about your diet, activity, dressing care, medications, and return visits. 4. Once at home, should issues with nausea, pain, or bleeding occur, or should you notice any signs of infection, you should call your surgeon. 5. Always clean your hands before and after caring for your wound. Do not let your family touch your surgery site without cleaning their hands. 6. Narcotic pain medications can cause significant constipation. You may want to add a stool softener to your postoperative medication schedule or speak to your surgeon on how best to manage this SIDE EFFECT. SPECIAL INSTRUCTIONS     Thank you for allowing us to care for you. We strive to exceed your expectations in the delivery of care and service provided to you and your family. If you need to contact us for any reason, please call us at [phone]    Instructions reviewed with patient during preadmission testing phone interview. Tigre Mims. 5/2/2019 .2:47 PM      ADDITIONAL EDUCATIONAL INFORMATION REVIEWED PER PHONE WITH YOU AND/OR YOUR FAMILY:  No Bring a urine sample on day of surgery  Yes Pain Goal-Taking Control of Your Pain  Yes FAQs about Surgical Site Infections  No Hibiclens® Bathing Instructions   Yes Antibacterial Soap  No Vinod® Wipes Bathing Instructions (Obtained from: [email or web address]/. pdf )  No Incentive Spirometer Education  No Other

## 2019-05-03 NOTE — PROGRESS NOTES
PRE-OP INSTRUCTIONS FOR THE SURGICAL PATIENT YOU ARE UNABLE TO MAKE CONTACT FOR AN INTERVIEW:      1. Follow instructions for your ARRIVAL TIME as DIRECTED BY YOUR SURGEON. 2. Enter the MAIN entrance located on [email or web address] and report to the desk. 3. Bring your insurance & prescription card and photo ID with you. You may also be asked to pay a co-pay, as you may want to bring a check or credit card with you. 4. Leave all other valuables at home. 5. Arrange for someone to drive you home and be with you for the first 24 hours after discharge. 6. You must contact your surgeon for ALL medication instructions, especially if taking blood thinners, aspirin, or diabetic medication. 7. A Pre-op History and Physical for surgery MUST be completed by your Physician or an Urgent Care within 30 days of your procedure date. Please bring a copy with you on the day of your procedure and along with any other testing performed. 8. DO NOT EAT ANYTHING eight hours prior to surgery. May have up to 8 ounces of water 4 hours prior to surgery. 9. No gum, candy, mints, or ice chips day of procedure. 10. Please refrain from drinking alcohol the day before or day of your procedure. 11. Please do not smoke the day of your procedure. 12. Dress in loose, comfortable clothing appropriate for redressing after your procedure. Do not wear jewelry (including body piercings), make-up, fingernail polish, lotion, powders or metal hairclips. 15. Contacts will need to be removed prior to surgery. You may want to bring your            Eye glasses to wear immediately before and after surgery. 14. Dentures will need to be removed before your procedure. 13. Bring cases for your glasses, contacts, dentures, or hearing aids to protect them while you are in surgery. 16. If you use a CPAP, please bring it with you on the day of your procedure.   17. Do not shave or wax for 72 hours prior to procedure near your

## 2019-05-04 ENCOUNTER — ANESTHESIA EVENT (OUTPATIENT)
Dept: OPERATING ROOM | Age: 59
End: 2019-05-04
Payer: COMMERCIAL

## 2019-05-06 ENCOUNTER — ANESTHESIA (OUTPATIENT)
Dept: OPERATING ROOM | Age: 59
End: 2019-05-06
Payer: COMMERCIAL

## 2019-05-06 ENCOUNTER — HOSPITAL ENCOUNTER (OUTPATIENT)
Age: 59
Setting detail: OUTPATIENT SURGERY
Discharge: HOME | End: 2019-05-06
Attending: ORTHOPAEDIC SURGERY | Admitting: ORTHOPAEDIC SURGERY
Payer: COMMERCIAL

## 2019-05-06 VITALS
HEIGHT: 64 IN | DIASTOLIC BLOOD PRESSURE: 68 MMHG | HEART RATE: 73 BPM | TEMPERATURE: 97.8 F | BODY MASS INDEX: 28.17 KG/M2 | SYSTOLIC BLOOD PRESSURE: 111 MMHG | RESPIRATION RATE: 14 BRPM | WEIGHT: 165 LBS | OXYGEN SATURATION: 96 %

## 2019-05-06 VITALS — SYSTOLIC BLOOD PRESSURE: 135 MMHG | DIASTOLIC BLOOD PRESSURE: 78 MMHG | OXYGEN SATURATION: 100 %

## 2019-05-06 DIAGNOSIS — M66.841 NONTRAUMATIC RUPTURE OF TENDON OF RIGHT THUMB: Primary | ICD-10-CM

## 2019-05-06 PROCEDURE — 2580000003 HC RX 258: Performed by: NURSE ANESTHETIST, CERTIFIED REGISTERED

## 2019-05-06 PROCEDURE — 2580000003 HC RX 258: Performed by: ANESTHESIOLOGY

## 2019-05-06 PROCEDURE — 7100000001 HC PACU RECOVERY - ADDTL 15 MIN: Performed by: ORTHOPAEDIC SURGERY

## 2019-05-06 PROCEDURE — 7100000010 HC PHASE II RECOVERY - FIRST 15 MIN: Performed by: ORTHOPAEDIC SURGERY

## 2019-05-06 PROCEDURE — 6370000000 HC RX 637 (ALT 250 FOR IP): Performed by: ORTHOPAEDIC SURGERY

## 2019-05-06 PROCEDURE — 3700000001 HC ADD 15 MINUTES (ANESTHESIA): Performed by: ORTHOPAEDIC SURGERY

## 2019-05-06 PROCEDURE — 2709999900 HC NON-CHARGEABLE SUPPLY: Performed by: ORTHOPAEDIC SURGERY

## 2019-05-06 PROCEDURE — 7100000000 HC PACU RECOVERY - FIRST 15 MIN: Performed by: ORTHOPAEDIC SURGERY

## 2019-05-06 PROCEDURE — 3600000014 HC SURGERY LEVEL 4 ADDTL 15MIN: Performed by: ORTHOPAEDIC SURGERY

## 2019-05-06 PROCEDURE — 6360000002 HC RX W HCPCS: Performed by: ORTHOPAEDIC SURGERY

## 2019-05-06 PROCEDURE — 3600000004 HC SURGERY LEVEL 4 BASE: Performed by: ORTHOPAEDIC SURGERY

## 2019-05-06 PROCEDURE — 3700000000 HC ANESTHESIA ATTENDED CARE: Performed by: ORTHOPAEDIC SURGERY

## 2019-05-06 PROCEDURE — 2500000003 HC RX 250 WO HCPCS: Performed by: ORTHOPAEDIC SURGERY

## 2019-05-06 PROCEDURE — 7100000011 HC PHASE II RECOVERY - ADDTL 15 MIN: Performed by: ORTHOPAEDIC SURGERY

## 2019-05-06 PROCEDURE — 6360000002 HC RX W HCPCS: Performed by: NURSE ANESTHETIST, CERTIFIED REGISTERED

## 2019-05-06 PROCEDURE — 2580000003 HC RX 258: Performed by: ORTHOPAEDIC SURGERY

## 2019-05-06 PROCEDURE — 6360000002 HC RX W HCPCS: Performed by: ANESTHESIOLOGY

## 2019-05-06 RX ORDER — MIDAZOLAM HYDROCHLORIDE 1 MG/ML
INJECTION INTRAMUSCULAR; INTRAVENOUS
Status: COMPLETED
Start: 2019-05-06 | End: 2019-05-06

## 2019-05-06 RX ORDER — OXYCODONE HYDROCHLORIDE 5 MG/1
5 TABLET ORAL PRN
Status: DISCONTINUED | OUTPATIENT
Start: 2019-05-06 | End: 2019-05-06 | Stop reason: HOSPADM

## 2019-05-06 RX ORDER — METOCLOPRAMIDE HYDROCHLORIDE 5 MG/ML
10 INJECTION INTRAMUSCULAR; INTRAVENOUS
Status: DISCONTINUED | OUTPATIENT
Start: 2019-05-06 | End: 2019-05-06 | Stop reason: HOSPADM

## 2019-05-06 RX ORDER — FENTANYL CITRATE 50 UG/ML
INJECTION, SOLUTION INTRAMUSCULAR; INTRAVENOUS
Status: COMPLETED
Start: 2019-05-06 | End: 2019-05-06

## 2019-05-06 RX ORDER — LABETALOL 20 MG/4 ML (5 MG/ML) INTRAVENOUS SYRINGE
5 EVERY 10 MIN PRN
Status: DISCONTINUED | OUTPATIENT
Start: 2019-05-06 | End: 2019-05-06 | Stop reason: HOSPADM

## 2019-05-06 RX ORDER — HYDRALAZINE HYDROCHLORIDE 20 MG/ML
5 INJECTION INTRAMUSCULAR; INTRAVENOUS EVERY 10 MIN PRN
Status: DISCONTINUED | OUTPATIENT
Start: 2019-05-06 | End: 2019-05-06 | Stop reason: HOSPADM

## 2019-05-06 RX ORDER — MORPHINE SULFATE 4 MG/ML
1 INJECTION, SOLUTION INTRAMUSCULAR; INTRAVENOUS EVERY 5 MIN PRN
Status: DISCONTINUED | OUTPATIENT
Start: 2019-05-06 | End: 2019-05-06 | Stop reason: HOSPADM

## 2019-05-06 RX ORDER — SODIUM CHLORIDE, SODIUM LACTATE, POTASSIUM CHLORIDE, CALCIUM CHLORIDE 600; 310; 30; 20 MG/100ML; MG/100ML; MG/100ML; MG/100ML
INJECTION, SOLUTION INTRAVENOUS CONTINUOUS
Status: DISCONTINUED | OUTPATIENT
Start: 2019-05-06 | End: 2019-05-06 | Stop reason: HOSPADM

## 2019-05-06 RX ORDER — PROMETHAZINE HYDROCHLORIDE 25 MG/ML
6.25 INJECTION, SOLUTION INTRAMUSCULAR; INTRAVENOUS
Status: DISCONTINUED | OUTPATIENT
Start: 2019-05-06 | End: 2019-05-06 | Stop reason: HOSPADM

## 2019-05-06 RX ORDER — HYDROCODONE BITARTRATE AND ACETAMINOPHEN 5; 325 MG/1; MG/1
1 TABLET ORAL EVERY 6 HOURS PRN
Qty: 18 TABLET | Refills: 0 | Status: SHIPPED | OUTPATIENT
Start: 2019-05-06 | End: 2019-05-11

## 2019-05-06 RX ORDER — ROPIVACAINE HYDROCHLORIDE 5 MG/ML
INJECTION, SOLUTION EPIDURAL; INFILTRATION; PERINEURAL PRN
Status: DISCONTINUED | OUTPATIENT
Start: 2019-05-06 | End: 2019-05-06 | Stop reason: SDUPTHER

## 2019-05-06 RX ORDER — SODIUM CHLORIDE, SODIUM LACTATE, POTASSIUM CHLORIDE, CALCIUM CHLORIDE 600; 310; 30; 20 MG/100ML; MG/100ML; MG/100ML; MG/100ML
INJECTION, SOLUTION INTRAVENOUS CONTINUOUS PRN
Status: DISCONTINUED | OUTPATIENT
Start: 2019-05-06 | End: 2019-05-06 | Stop reason: SDUPTHER

## 2019-05-06 RX ORDER — OXYCODONE HYDROCHLORIDE 5 MG/1
10 TABLET ORAL PRN
Status: DISCONTINUED | OUTPATIENT
Start: 2019-05-06 | End: 2019-05-06 | Stop reason: HOSPADM

## 2019-05-06 RX ORDER — FENTANYL CITRATE 50 UG/ML
INJECTION, SOLUTION INTRAMUSCULAR; INTRAVENOUS PRN
Status: DISCONTINUED | OUTPATIENT
Start: 2019-05-06 | End: 2019-05-06 | Stop reason: SDUPTHER

## 2019-05-06 RX ORDER — ROPIVACAINE HYDROCHLORIDE 5 MG/ML
INJECTION, SOLUTION EPIDURAL; INFILTRATION; PERINEURAL
Status: COMPLETED
Start: 2019-05-06 | End: 2019-05-06

## 2019-05-06 RX ORDER — GINSENG 100 MG
CAPSULE ORAL PRN
Status: DISCONTINUED | OUTPATIENT
Start: 2019-05-06 | End: 2019-05-06 | Stop reason: ALTCHOICE

## 2019-05-06 RX ORDER — MAGNESIUM HYDROXIDE 1200 MG/15ML
LIQUID ORAL CONTINUOUS PRN
Status: COMPLETED | OUTPATIENT
Start: 2019-05-06 | End: 2019-05-06

## 2019-05-06 RX ORDER — DIPHENHYDRAMINE HYDROCHLORIDE 50 MG/ML
12.5 INJECTION INTRAMUSCULAR; INTRAVENOUS
Status: DISCONTINUED | OUTPATIENT
Start: 2019-05-06 | End: 2019-05-06 | Stop reason: HOSPADM

## 2019-05-06 RX ORDER — MIDAZOLAM HYDROCHLORIDE 1 MG/ML
INJECTION INTRAMUSCULAR; INTRAVENOUS PRN
Status: DISCONTINUED | OUTPATIENT
Start: 2019-05-06 | End: 2019-05-06 | Stop reason: SDUPTHER

## 2019-05-06 RX ORDER — MEPERIDINE HYDROCHLORIDE 25 MG/ML
12.5 INJECTION INTRAMUSCULAR; INTRAVENOUS; SUBCUTANEOUS EVERY 5 MIN PRN
Status: DISCONTINUED | OUTPATIENT
Start: 2019-05-06 | End: 2019-05-06 | Stop reason: HOSPADM

## 2019-05-06 RX ADMIN — Medication 2 G: at 12:44

## 2019-05-06 RX ADMIN — FENTANYL CITRATE 50 MCG: 50 INJECTION INTRAMUSCULAR; INTRAVENOUS at 12:48

## 2019-05-06 RX ADMIN — ROPIVACAINE HYDROCHLORIDE 30 ML: 5 INJECTION, SOLUTION EPIDURAL; INFILTRATION; PERINEURAL at 12:05

## 2019-05-06 RX ADMIN — SODIUM CHLORIDE, SODIUM LACTATE, POTASSIUM CHLORIDE, AND CALCIUM CHLORIDE: 600; 310; 30; 20 INJECTION, SOLUTION INTRAVENOUS at 12:38

## 2019-05-06 RX ADMIN — FENTANYL CITRATE 50 MCG: 50 INJECTION INTRAMUSCULAR; INTRAVENOUS at 12:52

## 2019-05-06 RX ADMIN — FENTANYL CITRATE 100 MCG: 50 INJECTION INTRAMUSCULAR; INTRAVENOUS at 12:00

## 2019-05-06 RX ADMIN — MIDAZOLAM HYDROCHLORIDE 1 MG: 2 INJECTION, SOLUTION INTRAMUSCULAR; INTRAVENOUS at 12:34

## 2019-05-06 RX ADMIN — SODIUM CHLORIDE, POTASSIUM CHLORIDE, SODIUM LACTATE AND CALCIUM CHLORIDE: 600; 310; 30; 20 INJECTION, SOLUTION INTRAVENOUS at 11:45

## 2019-05-06 RX ADMIN — MIDAZOLAM HYDROCHLORIDE 2 MG: 2 INJECTION, SOLUTION INTRAMUSCULAR; INTRAVENOUS at 12:00

## 2019-05-06 RX ADMIN — MIDAZOLAM HYDROCHLORIDE 1 MG: 2 INJECTION, SOLUTION INTRAMUSCULAR; INTRAVENOUS at 12:48

## 2019-05-06 ASSESSMENT — PULMONARY FUNCTION TESTS
PIF_VALUE: 17
PIF_VALUE: 17
PIF_VALUE: 0
PIF_VALUE: 17
PIF_VALUE: 1
PIF_VALUE: 17
PIF_VALUE: 17
PIF_VALUE: 0
PIF_VALUE: 17
PIF_VALUE: 17
PIF_VALUE: 0
PIF_VALUE: 17
PIF_VALUE: 17
PIF_VALUE: 1
PIF_VALUE: 17
PIF_VALUE: 17
PIF_VALUE: 0
PIF_VALUE: 4
PIF_VALUE: 0
PIF_VALUE: 1
PIF_VALUE: 17
PIF_VALUE: 1
PIF_VALUE: 17
PIF_VALUE: 0
PIF_VALUE: 17
PIF_VALUE: 0
PIF_VALUE: 1
PIF_VALUE: 1
PIF_VALUE: 17
PIF_VALUE: 1
PIF_VALUE: 17
PIF_VALUE: 17
PIF_VALUE: 0
PIF_VALUE: 17
PIF_VALUE: 1
PIF_VALUE: 17
PIF_VALUE: 0
PIF_VALUE: 17
PIF_VALUE: 4
PIF_VALUE: 11
PIF_VALUE: 17
PIF_VALUE: 4
PIF_VALUE: 17
PIF_VALUE: 1
PIF_VALUE: 0
PIF_VALUE: 17
PIF_VALUE: 0
PIF_VALUE: 17
PIF_VALUE: 0
PIF_VALUE: 17
PIF_VALUE: 0

## 2019-05-06 ASSESSMENT — PAIN SCALES - GENERAL
PAINLEVEL_OUTOF10: 0

## 2019-05-06 NOTE — H&P
I have reviewed the history and physical and examined the patient and find no relevant changes. I have reviewed with the patient and/or family the risks, benefits, and alternatives to the procedure.     Jadyn Saenz MD  5/6/2019

## 2019-05-06 NOTE — ANESTHESIA PROCEDURE NOTES
Peripheral Block    Patient location during procedure: pre-op  Start time: 5/6/2019 12:00 PM  End time: 5/6/2019 12:10 PM  Staffing  Anesthesiologist: Kaley El MD  Performed: anesthesiologist   Preanesthetic Checklist  Completed: patient identified, site marked, surgical consent, pre-op evaluation, timeout performed, IV checked, risks and benefits discussed, monitors and equipment checked, anesthesia consent given, oxygen available and patient being monitored  Peripheral Block  Patient position: sitting  Prep: ChloraPrep  Patient monitoring: cardiac monitor, continuous pulse ox, frequent blood pressure checks and IV access  Block type: Brachial plexus and Axillary  Laterality: right  Injection technique: single-shot  Procedures: ultrasound guided  Provider prep: mask and sterile gloves  Needle  Needle type: short-bevel   Needle gauge: 20 G  Needle length: 10 cm  Needle localization: ultrasound guidance  Assessment  Injection assessment: negative aspiration for heme, no paresthesia on injection and local visualized surrounding nerve on ultrasound  Paresthesia pain: none  Slow fractionated injection: yes  Hemodynamics: stable  Additional Notes  Ultrasound-Guided Right Axillary Block Note    Indication: Postoperative analgesia upon request of the attending surgeon. Procedure: Patient supine. Right arm abducted ninety degrees and externally rotated. Forearm flexed ninety degrees. Axillary artery identified using ultrasound and a 22G 80mm insulated regional block needle was advanced into the axillary sheath under direct ultrasound visualization. Ropivacaine 0.5% was injected under ultrasound visualization with good spread noted throughout the sheath-30cc total volume injected. The patient tolerated the procedure well and there were no apparent complications at the time of the procedure.               Reason for block: primary anesthetic

## 2019-05-06 NOTE — ANESTHESIA PRE PROCEDURE
Department of Anesthesiology  Preprocedure Note       Name:  Shanika Kumari   Age:  63 y.o.  :  1960                                          MRN:  [id number]         Date:  2019      Surgeon: Vick Malagon):  Viral Juares MD    Procedure: RIGHT HAND EXTENSOR INDICIS PROPRUIS (EIP) TENDON TRANSFER TO EXTENSOR POLLICIS LONGUS; AND ANY OTHER INDICATED PROCEDURES (Right Hand)    Medications prior to admission:   Prior to Admission medications    Medication Sig Start Date End Date Taking?  Authorizing Provider   DULoxetine (CYMBALTA) 20 MG extended release capsule Take 20 mg by mouth daily   Yes Historical Provider, MD   Multiple Vitamins-Minerals (THERAPEUTIC MULTIVITAMIN-MINERALS) tablet Take 1 tablet by mouth daily   Yes Historical Provider, MD   GARLIC PO Take by mouth daily   Yes Historical Provider, MD   vitamin D (CHOLECALCIFEROL) 1000 UNIT TABS tablet Take 1,000 Units by mouth daily   Yes Historical Provider, MD   metoprolol tartrate (LOPRESSOR) 25 MG tablet Take 25 mg by mouth daily    Yes Historical Provider, MD       Current medications:    Current Facility-Administered Medications   Medication Dose Route Frequency Provider Last Rate Last Dose    ceFAZolin (ANCEF) 2 g in dextrose 5 % 50 mL IVPB  2 g Intravenous Once Viral Juares MD        lactated ringers infusion   Intravenous Continuous Lorenda Massy,  mL/hr at 19 1145      midazolam (VERSED) 2 MG/2ML injection             fentaNYL (SUBLIMAZE) 100 MCG/2ML injection             ropivacaine (NAROPIN) 0.5% injection             HYDROmorphone (DILAUDID) injection 0.25 mg  0.25 mg Intravenous Q5 Min PRN Mikal Leblanc MD        HYDROmorphone (DILAUDID) injection 0.5 mg  0.5 mg Intravenous Q5 Min PRN Mikal Leblanc MD        morphine injection 1 mg  1 mg Intravenous Q5 Min PRN Mikal Leblanc MD        HYDROmorphone (DILAUDID) injection 0.5 mg  0.5 mg Intravenous Q5 Min PRN Mikal Leblanc MD        oxyCODONE (ROXICODONE) immediate release tablet 5 mg  5 mg Oral PRN Monserrat Perales MD        Or    oxyCODONE (ROXICODONE) immediate release tablet 10 mg  10 mg Oral PRN Monserrat Perales MD        diphenhydrAMINE (BENADRYL) injection 12.5 mg  12.5 mg Intravenous Once PRN Monserrat Perales MD        metoclopramide (REGLAN) injection 10 mg  10 mg Intravenous Once PRN Monserrat Perales MD        [facility]) injection 6.25 mg  6.25 mg Intravenous Once PRN Monserrat Perales MD        labetalol (NORMODYNE;TRANDATE) injection 5 mg  5 mg Intravenous Q10 Min PRN Monserrat Perales MD        hydrALAZINE (APRESOLINE) injection 5 mg  5 mg Intravenous Q10 Min PRN Monserrat Perales MD        meperidine (DEMEROL) injection 12.5 mg  12.5 mg Intravenous Q5 Min PRN Monserrat Perales MD           Allergies:     Allergies   Allergen Reactions    Sulfur Rash     Sulfa drugs       Problem List:    Patient Active Problem List   Diagnosis Code    Closed nondisplaced fracture of second cervical vertebra ([facility] [address] S12.101A    Traumatic closed fracture of C2 vertebra with minimal displacement, initial encounter ([facility] [address] S12.100A    Closed fracture of right wrist S62.101A    Closed fracture of right distal radius S52.501A    Nontraumatic rupture of tendon of right thumb M66.841       Past Medical History:        Diagnosis Date    Anxiety     Arthritis     Atrial fibrillation ([facility] [address]     h/o atrial fib per cardiology    Hx of cardiomyopathy     H/O obstructive cardiomyopathy per cardiology    Hyperlipidemia     Hypertension        Past Surgical History:        Procedure Laterality Date     SECTION      COLONOSCOPY      LAPAROSCOPY         Social History:    Social History     Tobacco Use    Smoking status: Former Smoker     Packs/day: 1.00     Last attempt to quit: 10/2/2018     Years since quittin.5    Smokeless tobacco: Never Used   Substance Use Topics    Alcohol use: Yes     Comment: OCC Dental:          Pulmonary:Negative Pulmonary ROS                              Cardiovascular:    (+) hypertension:, dysrhythmias: atrial fibrillation, hyperlipidemia                  Neuro/Psych:                ROS comment: Patient has healing C2 fracture sustained 3/11/2019; in a C-spine collar and being treated conservatively GI/Hepatic/Renal: Neg GI/Hepatic/Renal ROS            Endo/Other:    (+) : arthritis:., .                 Abdominal:           Vascular:                                        Anesthesia Plan      regional     ASA 1    (64-year-old female presents for RIGHT HAND EXTENSOR INDICIS PROPRUIS (EIP) TENDON TRANSFER TO EXTENSOR POLLICIS LONGUS; AND ANY OTHER INDICATED PROCEDURES (Right Hand). Plan axillary block with ASA standard monitors. Questions answered. Patient agreeable with anesthetic plan.  )  Induction: intravenous. Anesthetic plan and risks discussed with patient. Plan discussed with CRNA.     Attending anesthesiologist reviewed and agrees with Pre Eval content        Nikky Richards MD   5/6/2019

## 2019-05-06 NOTE — BRIEF OP NOTE
Brief Postoperative Note  ______________________________________________________________    Patient: Nadya Rutledge  YOB: 1960  MRN: [id number]  Date of Procedure: 5/6/2019    Pre-Op Diagnosis: CLOSED FRACTURE OF RIGHT WRIST, RUPtURE OF  Extensor pollicis longus tendon RIGHT THUMB    Post-Op Diagnosis: Same       Procedure(s):  1 RIGHT HAND EXTENSOR INDICIS PROPRUIS (EIP) TENDON TRANSFER TO EXTENSOR POLLICIS LONGUS;    Anesthesia: Regional, Mac    Surgeon(s):  Mariah King MD    Assistant: [facility]     Estimated Blood Loss (mL): 20DO     Complications: None immediate apparent    Specimens:   none    Implants:  none      Drains: none    Findings: see fully dictated operative report    Marko Peralta MD  Date: 5/6/2019  Time: 2:23 PM

## 2019-05-06 NOTE — OP NOTE
[address]  Procedure Note  [address] [place]  5/6/19  Pre-operative Diagnosis:Right Thumb Extensor Pollicis Longus (EPL) rupture    Post-operative Diagnosis: same    Procedure:     Right Extensor Indicis Proprius (EIP) to EPL tendon transfer       Surgeon: Fausto Ramirez    Anesthesia:  Regional, Mac    Complications:  None immediate apparent    Specimens: none    Tourniquet time: 39 minutes    Blood loss: 10ml    Implants: None    INDICATIONS FOR PROCEDURE: The patient has rupture of the EPL and has failed appropriate conservative care. The patient understands the relevant risks, benefits, limitations, alternatives, and healing process of the procedure. PROCEDURE: The patient was given IV antibiotics, a regional block, and brought to the operating room and anesthetized with MAC anesthesia. The identified and marked extremity was then prepped and draped in a standard fashion. A well-padded tourniquet was applied to the upper arm. The arm was exsanguinated and the tourniquet elevated to 250 mmHg. Three separate incisions were made over the dorsum of the index MP, the dorsum of the wrist, and the dorsum of the thumb metacarpal. The EIP was harvested along the index MP capsule and tagged with a suture. We ensured that this was the most ulnar extensor tendon at this level at the Mp joint    Dissection carried through the wrist incision and the EIP was identified just distal to the extensor retinaculum. The EIP was then withdrawn to the wrist incision. At this point it was also confirmed that there was absence of the EPl tendon at this level. A tunnel was then created bluntly from the wrist exposure to the thumb in line with the remnant of EPL and its stump. This was performed after making a chevron incision just proximal to thumb MP joint and identifying the EPL tendon.   Using a Pulvertaft weave and 3-0 Fiberwire  suture, three separate passes were secured to the EPL as the wrist and thumb were held in extension. Good resting tension was recreated. The wounds were irrigated with sterile saline and hemostasis achieved with electrocautery. Closure was performed with Nylon at skin an vicryl at subcutaneous layer. A post-op dressing and thumb spica splint was applied and the patient transported to the recovery area in stable condition with good perfusion to all fingertips. Postop Plan:     Keep splint clean/dry/intact  Plan for immobilization of wrist and thumb per protocol, likely will begin gentle ROM of thumb starting at approximately 4 weeks postop.    Hand Therapy referral for custom orthosis thumb spica including IP joint, wrist and thumb in extension          MD Sabrina Lord

## 2019-05-06 NOTE — PROGRESS NOTES
Ambulatory Surgery/Procedure Discharge Note    Vitals:    05/06/19 1515   BP: 111/68   Pulse: 73   Resp: 14   Temp: 97.8 °F (36.6 °C)   SpO2: 96%       In: 104 [I.V.:104]  Out: -     Restroom use offered before discharge. Yes    Pain assessment:  present - adequately treated, no nausea, no dizziness, no questions re dc instructions  Pain Level: 0  Right arm dressing dry and intact, sling on        Patient discharged to home/self care.  Patient discharged via wheel chair by transporter to waiting family/S.O.       5/6/2019 4:09 PM

## 2019-05-06 NOTE — ANESTHESIA POSTPROCEDURE EVALUATION
Department of Anesthesiology  Postprocedure Note    Patient: Karlene Isaacs  MRN: [id number]  YOB: 1960  Date of evaluation: 5/6/2019  Time:  5:12 PM     Procedure Summary     Date:  05/06/19 [facility]    Anesthesia Start:  1723 Anesthesia Stop:  4762    Procedure:  RIGHT HAND EXTENSOR INDICIS PROPRUIS (EIP) TENDON TRANSFER TO EXTENSOR POLLICIS LONGUS; (Right Hand) Diagnosis:  (CLOSED FRACTURE OF RIGHT WRIST, NON-TRAUMATIC RUPEURE OF TENDON OF RIGHT THUMB)    Surgeon:  Ivone Katz MD Responsible Provider:  Lisandro Levine MD    Anesthesia Type:  regional ASA Status:  3          Anesthesia Type: regional    Mila Phase I: Mila Score: 9    Mila Phase II: Mila Score: 10    Last vitals: Reviewed and per EMR flowsheets.        Anesthesia Post Evaluation    Patient location during evaluation: PACU  Patient participation: complete - patient participated  Level of consciousness: awake and alert  Pain score: 0  Airway patency: patent  Nausea & Vomiting: no nausea and no vomiting  Complications: no  Cardiovascular status: hemodynamically stable  Respiratory status: acceptable  Hydration status: euvolemic

## 2019-05-06 NOTE — H&P
Rekha [facility]    [id number]    [facility] Same Day Surgery Update H & P  Department of General Surgery   Surgical Service   Pre-operative History and Physical  Last H & P within the last 30 days.     DIAGNOSIS:   CLOSED FRACTURE OF RIGHT WRIST, NON-TRAUMATIC RUPEURE OF TENDON OF RIGHT THUMB    PROCEDURE:  MD REPAIR EXTEN TENDON,DORSUM HAND,EA [[id number]] (HAND LIGAMENT TENDON REPAIR)     HISTORY OF PRESENT ILLNESS:    Patient with right wrist pain and lack of active extension following a fall while playing kickball on 3/11/19     Past Medical History:        Diagnosis Date    Anxiety     Arthritis     Atrial fibrillation (HCC)     h/o atrial fib per cardiology    Hx of cardiomyopathy     H/O obstructive cardiomyopathy per cardiology    Hyperlipidemia     Hypertension      Past Surgical History:        Procedure Laterality Date     SECTION      COLONOSCOPY      LAPAROSCOPY       Past Social History:  Social History     Socioeconomic History    Marital status:      Spouse name: None    Number of children: None    Years of education: None    Highest education level: None   Occupational History    None   Social Needs    Financial resource strain: None    Food insecurity:     Worry: None     Inability: None    Transportation needs:     Medical: None     Non-medical: None   Tobacco Use    Smoking status: Former Smoker     Packs/day: 1.00     Last attempt to quit: 10/2/2018     Years since quittin.5    Smokeless tobacco: Never Used   Substance and Sexual Activity    Alcohol use: Yes     Comment: OCC    Drug use: Not Currently    Sexual activity: None   Lifestyle    Physical activity:     Days per week: None     Minutes per session: None    Stress: None   Relationships    Social connections:     Talks on phone: None     Gets together: None     Attends Sabianism service: None     Active member of club or organization: None     Attends meetings of clubs or organizations: None

## 2019-05-09 DIAGNOSIS — M66.841 NONTRAUMATIC RUPTURE OF TENDON OF RIGHT THUMB: ICD-10-CM

## 2019-05-09 DIAGNOSIS — S62.101A CLOSED FRACTURE OF RIGHT WRIST, INITIAL ENCOUNTER: Primary | ICD-10-CM

## 2019-05-14 ENCOUNTER — OFFICE VISIT (OUTPATIENT)
Dept: ORTHOPEDIC SURGERY | Age: 59
End: 2019-05-14

## 2019-05-14 ENCOUNTER — HOSPITAL ENCOUNTER (OUTPATIENT)
Dept: OCCUPATIONAL THERAPY | Age: 59
Setting detail: THERAPIES SERIES
Discharge: HOME | End: 2019-05-14
Payer: COMMERCIAL

## 2019-05-14 DIAGNOSIS — M66.841 NONTRAUMATIC RUPTURE OF TENDON OF RIGHT THUMB: Primary | ICD-10-CM

## 2019-05-14 DIAGNOSIS — S52.571A OTHER CLOSED INTRA-ARTICULAR FRACTURE OF DISTAL END OF RIGHT RADIUS, INITIAL ENCOUNTER: ICD-10-CM

## 2019-05-14 PROCEDURE — 99024 POSTOP FOLLOW-UP VISIT: CPT | Performed by: ORTHOPAEDIC SURGERY

## 2019-05-14 PROCEDURE — L3808 WHFO, RIGID W/O JOINTS: HCPCS | Performed by: OCCUPATIONAL THERAPIST

## 2019-05-14 NOTE — PROGRESS NOTES
Chief Complaint:  Post-Op Check (right thumb)      History of Present of Illness:  Ms. Den Cano is a 76-year-old female presenting as a 1st postoperative visit after right thumb surgery  Procedure: Right thumb tendon transfer with EIP to EPL tendon transfer  Date of procedure: 5/6/2019  The patient presents 8 days status post surgery. She has been immobilized in a thumb spica splint since her surgery. She does have some swelling in her fingers and soreness in her thumb and wrist but overall this is been progressively improving. No fever chills or other constitutional symptoms  She denies any other events or complaints today    Examination:  General: Pleasant, well-appearing, no acute distress  Right upper extremity/right thumb and wrist  Well appearing incisions with minimal swelling, no erythema or fluctuance and sutures in place  Appropriate tenodesis and finger cascade including thumb  With limited testing patient does demonstrate active thumb extension, no formal extension or flexion testing her range of motion otherwise noted secondary to recent surgery  Gross sensation intact to median ulnar and radial nerve throughout the hand without deficit  Capillary refill brisk in all fingers  Limited wrist range of motion tested with no pain or crepitus    Radiology:     X-rays obtained and reviewed in office:  No new imaging obtained today    No orders of the defined types were placed in this encounter. Impression:  Encounter Diagnoses   Name Primary?     Nontraumatic rupture of tendon of right thumb Yes    Other closed intra-articular fracture of distal end of right radius, initial encounter          Treatment Plan:  The patient demonstrates appropriate healing now just over 1 week status post tendon transfer surgery  We will plan for suture removal and placement of Steri-Strips, patient can gently remove her protection for hygiene only  She will be referred to occupational therapy for thumb spica orthosis with

## 2019-05-14 NOTE — PLAN OF CARE
[facility] [employer]  [address] [place] [address]  Phone [phone]      Fax [phone]      Patient: Shanika Kumari   : 1960  MRN: [id number]  Referring Physician: Referring Practitioner: Lisa Saldana    Evaluation Date: 2019      Medical Diagnosis Information:  Diagnosis: R closed wrist fracture  S62.101A  R thumb EPL nontraumatic rupture  D78.165           Occupational Therapy Splint Certification Form  Dear Referring Practitioner: Lisa Saldana,  The following patient has been evaluated for occupational therapy services for fabrication of a wrist thumb extension brace. Insurance requires the referring physician to review the treatment plan. Please review the attached evaluation and/or summary of the patient's plan of care, and verify that you agree by signing the attached document and sending it back to our office. Plan of Care/Treatment to date:  [x] Fabrication of custom thumb/wrist ext splint       [x] Instruction on splint use, care and wearing schedule        [] Follow up as needed for splint modifications          Frequency/Duration:  [] One time visit for splint fabrication and instructions. Follow up as needed for splint modifications        [x] Splint fabricated, patient to return for full evaluation; to be determined by MD    Rehab Potential: [x] good [] fair  [] poor         SPLINT EVALUATION  Date: 2019  Name: Shanika Kumari            : 1960      Medical/Treatment Diagnosis Information:  Diagnosis: R closed wrist fracture  S62.101A  R thumb EPL nontraumatic rupture  L52.177     Insurance/Certification information:     Physician Information:  Referring Practitioner: Lisa Saldana       Next MD Appointment:    Subjective  History of Injury/ Mechanism of Injury:Hurt neck and wrist while playing kickball  3/11/19. No surgery for wrist fracture.   Surgery Date: 19  Dominant Hand:    [x] Right []Left  Occupational/Vocational Status: Teacher  Progress of any previous OT/PT: the patient []has/ [x]has not received OT/PT previously for this diagnosis. Pain:4-5/10    Objective Findings: Moderate edema R digits . Stitches removed at MD appointment with steri strips in place  Type of splint:   Thumb /wrist forearm based ext splint  Splint protocol utilization:  At all times except bathing  Splint Purpose: [x]Immobilize or protect [x]Promote healing of tendon   [x]Relieve pain  []Provide support for improved hand function []Maximize joint motion    Treatment:   [x]Splint provided ([x]Customized/ []Prefabricated), and splint rationale explained. [x]Patient instructed in [x]wear/ [x]care of splint and educated regarding diagnosis. [x]Patient instructed in symptom reduction techniques   []HEP instruction    []Discussed ADL assistive device    Written Information Distributed: []HEP  [x]Splint care and wearing protocol    Patient response to evaluation and instructions:  [x]Attentive/interested   [x]Asked questions/ retained info  []Appeared disinterested  []Poor retention of information  []Appeared anxious/ fearful    Assessment and Plan:  Goals: [x]Patient will be able to verbalize rationale for, and demonstrate proper wearing     of splint. [x]Splint will provide proper fit and function. [x]Patient will be able to verbalize 2-3 ways to prevent further symptoms. [x]Patient will be able to don and doff independently. []Patient will be independent with HEP    Goals met:  [x]yes []no    Plan:  []Splint completed with good fit and function. Hand Therapy to follow up for     splint modifications as needed    [x]Splint completed; OT/PT evaluation initiated.   Patient to return for further     treatment when indicated by MD    [address]  OTR/L [facility]  [place] [id number]

## 2019-05-14 NOTE — LETTER
[facility]  [address] [address]  Phone: [phone]  Fax: [phone]    Noemi Geronimo MD        May 14, 2019     Patient: Oscar Caputo   YOB: 1960   Date of Visit: 5/14/2019       To Whom It May Concern: It is my medical opinion that Elian Hope should remain out of work until 6/5/2019. If you have any questions or concerns, please don't hesitate to call.     Sincerely,          Noemi Geronimo MD

## 2019-06-05 ENCOUNTER — OFFICE VISIT (OUTPATIENT)
Dept: ORTHOPEDIC SURGERY | Age: 59
End: 2019-06-05

## 2019-06-05 DIAGNOSIS — S52.571A OTHER CLOSED INTRA-ARTICULAR FRACTURE OF DISTAL END OF RIGHT RADIUS, INITIAL ENCOUNTER: ICD-10-CM

## 2019-06-05 DIAGNOSIS — M66.841 NONTRAUMATIC RUPTURE OF TENDON OF RIGHT THUMB: Primary | ICD-10-CM

## 2019-06-05 PROCEDURE — 99024 POSTOP FOLLOW-UP VISIT: CPT | Performed by: ORTHOPAEDIC SURGERY

## 2019-06-05 NOTE — PROGRESS NOTES
Chief Complaint:  Follow-up (right wrist)      History of Present of Illness:  Ms. Ander Day is a 52-year-old female presenting as a repeat scheduled postoperative visit after right thumb surgery  Procedure: Right thumb tendon transfer with EIP to EPL tendon transfer Secondary to nondisplaced distal radius fracture  Date of procedure: 5/6/2019  She is now 1 month status post tendon transfer nearly 3 months status post injury to her wrist which occurred on 3/11/2019  The patient states that she has not been reliably wearing her thumb spica brace recently. She feels that it is uncomfortable and she does have some soreness in her thumb and wrist.  She did fall over the weekend as well and did use her hand to brace herself  She denies any additional pain throughout her right upper extremity      Examination:  General: Pleasant, well-appearing, no acute distress  Right upper extremity/right thumb and wrist  Well-healed incisions, slightly adherent and firm, no erythema or additional skin changes  Appropriate tenodesis and finger cascade including thumb  With limited testing patient does demonstrate active thumb extension, with simultaneous index finger and thumb extension patient has more mobility and comfort to extend the thumb actively, no passive testing, active thumb flexion is present  Gross sensation intact to median ulnar and radial nerve throughout the hand without deficit  Capillary refill brisk in all fingers  Limited wrist range of motion tested with no pain or crepitus  Nontender throughout distal radius to palpation      Radiology:     X-rays obtained and reviewed in office:  Views 3  Location right wrist  Impression maintained alignment of right distal radius fracture, interval healing, no evidence of additional osseous abnormalities    Orders Placed This Encounter   Procedures    XR WRIST RIGHT (MIN 3 VIEWS)       Impression:  Encounter Diagnoses   Name Primary?     Nontraumatic rupture of tendon of right thumb Yes    Other closed intra-articular fracture of distal end of right radius, initial encounter          Treatment Plan: With regards to her fracture the patient is now nearly 3 months status post injury. I do think that she has a healed fracture with appropriate alignment. The main focus at this point is regarding her tendon rupture and subsequent tendon transfer. She is now 1 month status post tendon transfer. She admits that she has not been compliant with our protocol to keep the tendon protected. I do think that her tendon transfer remains intact and we will proceed with protocol now starting with some gentle active motion guided by therapy for continued with immobilization. I did stress the importance of immobilization to allow further tendon healing and avoid injury or rupture. She will continue to wear her orthosis as instructed over the next 3 weeks and I will see her back during that time. She will be referred to therapy today for a readjustment and molding of her splint to make it more comfortable for her.   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

## 2019-06-07 ENCOUNTER — HOSPITAL ENCOUNTER (OUTPATIENT)
Dept: CT IMAGING | Age: 59
Discharge: HOME | End: 2019-06-07
Payer: COMMERCIAL

## 2019-06-07 DIAGNOSIS — S12.291S: ICD-10-CM

## 2019-06-07 PROCEDURE — 72125 CT NECK SPINE W/O DYE: CPT

## 2019-06-12 ENCOUNTER — HOSPITAL ENCOUNTER (OUTPATIENT)
Dept: OCCUPATIONAL THERAPY | Age: 59
Setting detail: THERAPIES SERIES
Discharge: HOME | End: 2019-06-12
Payer: COMMERCIAL

## 2019-06-12 PROCEDURE — 97110 THERAPEUTIC EXERCISES: CPT

## 2019-06-12 PROCEDURE — 97165 OT EVAL LOW COMPLEX 30 MIN: CPT

## 2019-06-12 PROCEDURE — 97140 MANUAL THERAPY 1/> REGIONS: CPT

## 2019-06-12 NOTE — FLOWSHEET NOTE
[facility], [employer]  [facility] [place] [address]  Phone [phone]      Fax [phone]      Hand Therapy Daily Treatment Note  Date:  2019    Patient: Shanika Kumari   : 1960   MRN: [id number]  Referring Physician: Referring Practitioner: Lisa Saldana MD       Medical Diagnosis Information:  Diagnosis: Diagnosis: R closed wrist fracture  S62.101A  R thumb EPL nontraumatic rupture  M66    Treatment Diagnosis: M25.641 R hand stiffness                                         Insurance information: OT Insurance Information: [facility]  Date of Injury: 3/11/19  Date of Surgery: 19 EIP to EPL transfer          Visit # Insurance Allowable    until      Date of Patient follow up with Physician: 19    Progress Note: []  Yes  [x]  No  Next due by: Visit #10      Latex Allergy:  [x]No      []Yes  Pacemaker:  [x] No       [] Yes     Preferred Language for Healthcare:   [x]English       []other:    SUBJECTIVE:  See eval     Functional Disability Index: Quick DASH score - 42-70%% perceived disability taken on 2019    Pain level:  2/10 Intermittent/stiffness thumb      RESTRICTIONS/PRECAUTIONS: splint and gentle AROM out of splint only-no use      OBJECTIVE:     19  AROM: Right Left   IF MP  PIP  DIP    3 cm     LF MP  PIP  DIP    3 cm     RF MP  PIP   DIP    2 cm     SF MP  PIP  DIP    3 cm     Digits: tips to DPFC                Thumb MP  IP 5/20  0/5     Thumb tip to DPFC To IF tip     Thumb RA               PA 50     Wrist Ext/Flex            RD/UD 15/40 50/60           Edema: Min especially thumb                     Strength: NT      II       Lateral Pinch       3 Point Pinch       Tip Pinch       MMT:                  Date:  2019       Objective Measures:         See above                       Modalities: HP 15 min to increase tissue extensibility Therapeutic Exercise, Activities, NMR:         AROM to thumb/digits/wrist x 10 each                Scar massage/STM to thumb and wrist        HEP as above and discussed healing/scarring/protocol        Splint fitting well-cont between exercises at all times         Therapeutic Exercise and NMR:  [x] (86021) Provided verbal/tactile cueing for activities related to strengthening, flexibility, endurance, ROM  for improvements in scapular, scapulothoracic and UE control with self care, reaching, carrying, lifting, house/yardwork, driving/computer work.    [] (99544) Provided verbal/tactile cueing for activities related to improving balance, coordination, kinesthetic sense, posture, motor skill, proprioception  to assist with  scapular, scapulothoracic and UE control with self care, reaching, carrying, lifting, house/yardwork, driving/computer work.   [] Comments:    Therapeutic Activities:    [] (89743 or 00149) Provided verbal/tactile cueing for activities related to improving balance, coordination, kinesthetic sense, posture, motor skill, proprioception and motor activation to allow for proper function of scapular, scapulothoracic and UE control with self care, carrying, lifting, driving/computer work  [] Comments:    Home Exercise Program:    [] (02870) Reviewed/Progressed HEP activities related to strengthening, flexibility, endurance, ROM of scapular, scapulothoracic and UE control with self care, reaching, carrying, lifting, house/yardwork, driving/computer work  [] (60018) Reviewed/Progressed HEP activities related to improving balance, coordination, kinesthetic sense, posture, motor skill, proprioception of scapular, scapulothoracic and UE control with self care, reaching, carrying, lifting, house/yardwork, driving/computer work    [] Comments:    Manual Treatments:  PROM / STM / Oscillations-Mobs:  G-I, II, III, IV (PA's, Inf., Post.)  [x] (41950) Provided manual therapy to mobilize soft tissue/joints of cervical/CT, scapular GHJ and UE for the purpose of modulating pain, promoting relaxation,  increasing ROM, reducing/eliminating soft tissue swelling/inflammation/restriction, improving soft tissue extensibility and allowing for proper ROM for normal function with self care, reaching, carrying, lifting, house/yardwork, driving/computer work  [] Comments:    ADL Training:  [] (38434) Provided self-care/home management training related to activities of daily living and compensatory training, and/or use of adaptive equipment   [] Comments:     Splinting:  [] Fabrication of:   [] (54590) Orthotic/Prosthetic Management, subsequent encounter  [] (40693) Orthotic management and training (fitting and assessment)  [] Comments:    Charges:  Timed Code Treatment Minutes: 45   Total Treatment Minutes: 65   10:55-12 pm  [x] EVAL (LOW) 16852   [] OT Re-eval (95425)  [] EVAL (MOD) 30469   [] EVAL (HIGH) 78410       [x] Trinity (35118) x   2  [] VFPDP(28043)  [] NMR (23426) x     [] Estim (attended) (17299)   [x] Manual ([id number]) x   1  [] US (42478)  [] TA (51094) x     [] Paraffin (30389)  [] ADL  (07045) x    [] Splint/L code:    [] Estim (unattended) (44767)  [] Fluidotherapy (64316)  [] Other:    GOALS:   Short Term Goals: To be achieved in: 2 weeks  1. Independent in HEP and progression per patient tolerance, in order to prevent re-injury. 2. Patient will have a decrease in pain to facilitate improvement in movement, function, and ADLs as indicated by Functional Deficits.     Long Term Goals to be achieved in 12  weeks, including patient directed goals to address identified performance deficits:  1) Pt to be independent in graded HEP progression with a good level of effort and compliance. 2) Pt to report a score of 70 % or less on the Quick DASH disability questionnaire for increased performance with carrying, moving, and handling objects.   3) Pt will demonstrate increased ROM to thumb/digits/wrist for improved grooming/writing. 4) Pt will demonstrate increased strength to R hand for improved gardening/driving. 5) Pt will have a decrease in pain to hand to facilitate improvement in independent ADL's.  6)          Progression Towards Functional goals:  [] Patient is progressing as expected towards functional goals listed. [] Progression is slowed due to complexities listed. [] Progression has been slowed due to co-morbidities. [x] Plan just implemented, too soon to assess goals progression  [] All goals are met  [] Other:     ASSESSMENT:  See eval    Treatment/Activity Tolerance:  [x] Patient tolerated treatment well [] Patient limited by fatique  [] Patient limited by pain  [] Patient limited by other medical complications  [] Other:     Prognosis: [x] Good [] Fair  [] Poor    Patient Requires Follow-up: [x] Yes  [] No    PLAN: See eval  [] Continue per plan of care [] Alter current plan (see comments)  [x] Plan of care initiated [] Hold pending MD visit [] Discharge    If patient does not return for further follow ups after this date, please consider this as the patient's discharge from occupational therapy.     Electronically signed by: Ailyn Holloway OTR/L [address] [place] [address] [id number]

## 2019-06-19 ENCOUNTER — HOSPITAL ENCOUNTER (OUTPATIENT)
Dept: OCCUPATIONAL THERAPY | Age: 59
Setting detail: THERAPIES SERIES
Discharge: HOME | End: 2019-06-19
Payer: COMMERCIAL

## 2019-06-19 PROCEDURE — 97022 WHIRLPOOL THERAPY: CPT | Performed by: OCCUPATIONAL THERAPIST

## 2019-06-19 PROCEDURE — 97140 MANUAL THERAPY 1/> REGIONS: CPT | Performed by: OCCUPATIONAL THERAPIST

## 2019-06-19 PROCEDURE — 97110 THERAPEUTIC EXERCISES: CPT | Performed by: OCCUPATIONAL THERAPIST

## 2019-06-19 NOTE — FLOWSHEET NOTE
[facility] [employer]  [facility] [place] [address]  Phone [phone]      Fax [phone]      Hand Therapy Daily Treatment Note  Date:  2019    Patient: Varsha Bueno   : 1960   MRN: [id number]  Referring Physician:         Medical Diagnosis Information:                                                 Insurance information:    Date of Injury: 3/11/19  Date of Surgery: 19 EIP to EPL transfer          Visit # Insurance Allowable    until      Date of Patient follow up with Physician: 19    Progress Note: []  Yes  [x]  No  Next due by: Visit #10      Latex Allergy:  [x]No      []Yes  Pacemaker:  [x] No       [] Yes     Preferred Language for Healthcare:   [x]English       []other:    SUBJECTIVE:  Notes increased finger AROM, but wrist is very stiff    Functional Disability Index: Quick DASH score - 42-70%% perceived disability taken on 2019    Pain level:  2/10 Intermittent/stiffness thumb      RESTRICTIONS/PRECAUTIONS: splint and gentle AROM out of splint only-no use      OBJECTIVE:     19  AROM: Right Left   IF MP  PIP  DIP    3 cm     LF MP  PIP  DIP    3 cm     RF MP  PIP   DIP    2 cm     SF MP  PIP  DIP    3 cm     Digits: tips to DPFC                Thumb MP  IP 5/20  0/5     Thumb tip to DPFC To IF tip     Thumb RA               PA 50     Wrist Ext/Flex            RD/UD 15/40 50/60           Edema: Min especially thumb                     Strength: NT      II       Lateral Pinch       3 Point Pinch       Tip Pinch       MMT:                  Date:  2019      Objective Measures:         See above                       Modalities: HP 15 min to increase tissue extensibility         fluidotherapy  10'                      Therapeutic Exercise, Activities, NMR:        ROM AROM to thumb/digits/wrist x 10 each AROM to thumb/digits/wrist x 10 each    Added th blocked IP flex    Added AAROM to wrist ext and th ext/abd    Th flex to foam cube  10x      Function  Lg foam cubes  and hold 4 , move to radial side of hand and release 1 at a time    Rope wristciser  5x2      Manual Scar massage/STM to thumb and wrist Scar massage     retrograde to hand       edema glove issued to assist with hand edema       HEP as above and discussed healing/scarring/protocol        Splint fitting well-cont between exercises at all times         Therapeutic Exercise and NMR:  [x] (31510) Provided verbal/tactile cueing for activities related to strengthening, flexibility, endurance, ROM  for improvements in scapular, scapulothoracic and UE control with self care, reaching, carrying, lifting, house/yardwork, driving/computer work. [x] (17936) Provided verbal/tactile cueing for activities related to improving balance, coordination, kinesthetic sense, posture, motor skill, proprioception  to assist with  scapular, scapulothoracic and UE control with self care, reaching, carrying, lifting, house/yardwork, driving/computer work.   [] Comments:    Therapeutic Activities:    [] (20082 or 52773) Provided verbal/tactile cueing for activities related to improving balance, coordination, kinesthetic sense, posture, motor skill, proprioception and motor activation to allow for proper function of scapular, scapulothoracic and UE control with self care, carrying, lifting, driving/computer work  [] Comments:    Home Exercise Program:    [x] (05376) Reviewed/Progressed HEP activities related to strengthening, flexibility, endurance, ROM of scapular, scapulothoracic and UE control with self care, reaching, carrying, lifting, house/yardwork, driving/computer work  [] (18206) Reviewed/Progressed HEP activities related to improving balance, coordination, kinesthetic sense, posture, motor skill, proprioception of scapular, scapulothoracic and UE control with self care, reaching, carrying, lifting, house/yardwork, driving/computer work    [] Comments:    Manual Treatments:  PROM / STM / Oscillations-Mobs:  G-I, II, III, IV (PA's, Inf., Post.)  [x] (95436) Provided manual therapy to mobilize soft tissue/joints of cervical/CT, scapular GHJ and UE for the purpose of modulating pain, promoting relaxation,  increasing ROM, reducing/eliminating soft tissue swelling/inflammation/restriction, improving soft tissue extensibility and allowing for proper ROM for normal function with self care, reaching, carrying, lifting, house/yardwork, driving/computer work  [] Comments:    ADL Training:  [] (87126) Provided self-care/home management training related to activities of daily living and compensatory training, and/or use of adaptive equipment   [] Comments:     Splinting:  [] Fabrication of:   [] (99060) Orthotic/Prosthetic Management, subsequent encounter  [] (80997) Orthotic management and training (fitting and assessment)  [] Comments:    Charges:  Timed Code Treatment Minutes: 50   Total Treatment Minutes: 65   11-12:05  [] EVAL (LOW) 59536   [] OT Re-eval (64658)  [] EVAL (MOD) 46072   [] EVAL (HIGH) 39372       [x] Trinity (58883) x   2  [] FSHYS(93807)  [] NMR (11085) x     [] Estim (attended) (47443)   [x] Manual ([id number]) x   1  [] US (33771)  [] TA (45131) x     [] Paraffin (83390)  [] ADL  ([phone]) x    [] Splint/L code:    [] Estim (unattended) 33 93 31)  [x] Fluidotherapy (54422)  [] Other:    GOALS:   Short Term Goals: To be achieved in: 2 weeks  1. Independent in HEP and progression per patient tolerance, in order to prevent re-injury. 2. Patient will have a decrease in pain to facilitate improvement in movement, function, and ADLs as indicated by Functional Deficits.     Long Term Goals to be achieved in 12  weeks, including patient directed goals to address identified performance deficits:  1) Pt to be independent in graded HEP progression with a good level of effort and compliance.   2) Pt to report a score of 70 % or less on the Quick DASH disability questionnaire for increased performance with carrying, moving, and handling objects. 3) Pt will demonstrate increased ROM to thumb/digits/wrist for improved grooming/writing. 4) Pt will demonstrate increased strength to R hand for improved gardening/driving. 5) Pt will have a decrease in pain to hand to facilitate improvement in independent ADL's.  6)          Progression Towards Functional goals:  [] Patient is progressing as expected towards functional goals listed. [] Progression is slowed due to complexities listed. [] Progression has been slowed due to co-morbidities. [x] Plan just implemented, too soon to assess goals progression  [] All goals are met  [] Other:     ASSESSMENT:  Pt felt fluidotherapy was beneficial.  Wrist remains stiff. Tolerated therapy with minimal complaints of pain. Treatment/Activity Tolerance:  [x] Patient tolerated treatment well [] Patient limited by fatique  [] Patient limited by pain  [] Patient limited by other medical complications  [] Other:     Prognosis: [x] Good [] Fair  [] Poor    Patient Requires Follow-up: [x] Yes  [] No    PLAN: Progress per protocol. [x] Continue per plan of care [] Alter current plan (see comments)  [] Plan of care initiated [] Hold pending MD visit [] Discharge    If patient does not return for further follow ups after this date, please consider this as the patient's discharge from occupational therapy. Electronically signed by:  Varun NAYAK/DANIEL [address] [place] [address] [id number]

## 2019-06-26 ENCOUNTER — OFFICE VISIT (OUTPATIENT)
Dept: ORTHOPEDIC SURGERY | Age: 59
End: 2019-06-26

## 2019-06-26 ENCOUNTER — HOSPITAL ENCOUNTER (OUTPATIENT)
Dept: OCCUPATIONAL THERAPY | Age: 59
Setting detail: THERAPIES SERIES
Discharge: HOME | End: 2019-06-26
Payer: COMMERCIAL

## 2019-06-26 DIAGNOSIS — M66.841 NONTRAUMATIC RUPTURE OF TENDON OF RIGHT THUMB: Primary | ICD-10-CM

## 2019-06-26 DIAGNOSIS — S52.571A OTHER CLOSED INTRA-ARTICULAR FRACTURE OF DISTAL END OF RIGHT RADIUS, INITIAL ENCOUNTER: ICD-10-CM

## 2019-06-26 PROCEDURE — 97022 WHIRLPOOL THERAPY: CPT | Performed by: OCCUPATIONAL THERAPIST

## 2019-06-26 PROCEDURE — 97110 THERAPEUTIC EXERCISES: CPT | Performed by: OCCUPATIONAL THERAPIST

## 2019-06-26 PROCEDURE — 97140 MANUAL THERAPY 1/> REGIONS: CPT | Performed by: OCCUPATIONAL THERAPIST

## 2019-06-26 PROCEDURE — 99024 POSTOP FOLLOW-UP VISIT: CPT | Performed by: ORTHOPAEDIC SURGERY

## 2019-06-26 NOTE — FLOWSHEET NOTE
[address] [place] [address]  Phone [phone]      Fax [phone]      Hand Therapy Daily Treatment Note  Date:  2019    Patient: Babatunde Alatorre   : 1960   MRN: [id number]  Referring Physician: Referring Practitioner: Tami Valdez MD       Medical Diagnosis Information:  Diagnosis: Diagnosis: R closed wrist fracture  S62.101A  R thumb EPL nontraumatic rupture  M66    Treatment Diagnosis: M25.641 R hand stiffness                                         Insurance information: OT Insurance Information: [facility]  Date of Injury: 3/11/19  Date of Surgery: 19 EIP to EPL transfer          Visit # Insurance Allowable   3 18 until      Date of Patient follow up with Physician: 19    Progress Note: []  Yes  [x]  No  Next due by: Visit #10      Latex Allergy:  [x]No      []Yes  Pacemaker:  [x] No       [] Yes     Preferred Language for Healthcare:   [x]English       []other:    SUBJECTIVE:  Reports edema glove is really helping the swelling    Functional Disability Index: Quick DASH score - 42-70%% perceived disability taken on 2019    Pain level:  2/10 Intermittent/stiffness thumb  Current: 1/10 in thumb      RESTRICTIONS/PRECAUTIONS: splint and gentle AROM out of splint only-no use   19 MD Note : I think that beginning to wean from the brace over the next 1 week is appropriate and using her thumb and hand for daily functional activities without the brace will help to improve some of her motion  No lifting or gripping greater than 2 to 3 pounds at this point but will progress over the next several weeks.   Continue with edema control, scar management and motion  She is to continue with occupational therapy for guidance during this time        OBJECTIVE:     19  AROM: Right Left Right  19  7.5 wks   IF MP  PIP  DIP    3 cm   2   LF MP  PIP  DIP    3 cm   2   RF MP  PIP   DIP    2 cm   2   SF MP  PIP  DIP    3 cm   2   TH   3.5   Digits: tips to [facility]                 Thumb MP  IP 5/20  0/5   10/40  0/20   [facility] To [facility]   To side of RF   Thumb RA               PA 50      Wrist Ext/Flex            RD/UD 15/40 50/60 60/35            Edema: Min especially thumb   TH    prox phalanx 7.3  PIP 7  distal phalanx 6.5    L  6.5  6.7  5.9                     Strength: NT       II        Lateral Pinch        3 Point Pinch        Tip Pinch        MMT:                   Date:  6/12/2019 6/19/19 6/26/19     Objective Measures:         See above  See above                     Modalities: HP 15 min to increase tissue extensibility         fluidotherapy  10' 10'                     Therapeutic Exercise, Activities, NMR:        ROM AROM to thumb/digits/wrist x 10 each AROM to thumb/digits/wrist x 10 each    Added th blocked IP flex    Added AAROM to wrist ext and th ext/abd    Th flex to foam cube  10x AAROM  Hook, composite fist, straight fist,   th IP. composite th flex, th opposition, wrist ext/flex, RD? UD    Th flex -to foam cube  10x     Function  Lg foam cubes  and hold 4 , move to radial side of hand and release 1 at a time    Rope wristciser  5x2  lg foam cubes with thumb and SF      Rope wristciser  5X2     Manual Scar massage/STM to thumb and wrist Scar massage     retrograde to hand       edema glove issued to assist with hand edema STM to ext musculature,  Scar massage, retrograde massage      HEP as above and discussed healing/scarring/protocol        Splint fitting well-cont between exercises at all times         Therapeutic Exercise and NMR:  [x] (86707) Provided verbal/tactile cueing for activities related to strengthening, flexibility, endurance, ROM  for improvements in scapular, scapulothoracic and UE control with self care, reaching, carrying, lifting, house/yardwork, driving/computer work.     [x] (71632) Provided verbal/tactile cueing for activities related to improving balance, coordination, kinesthetic sense, posture, motor skill, proprioception  to assist with  scapular, scapulothoracic and UE control with self care, reaching, carrying, lifting, house/yardwork, driving/computer work.   [] Comments:    Therapeutic Activities:    [] (83849 or 74139) Provided verbal/tactile cueing for activities related to improving balance, coordination, kinesthetic sense, posture, motor skill, proprioception and motor activation to allow for proper function of scapular, scapulothoracic and UE control with self care, carrying, lifting, driving/computer work  [] Comments:    Home Exercise Program:    [x] (64844) Reviewed/Progressed HEP activities related to strengthening, flexibility, endurance, ROM of scapular, scapulothoracic and UE control with self care, reaching, carrying, lifting, house/yardwork, driving/computer work  [] (54956) Reviewed/Progressed HEP activities related to improving balance, coordination, kinesthetic sense, posture, motor skill, proprioception of scapular, scapulothoracic and UE control with self care, reaching, carrying, lifting, house/yardwork, driving/computer work    [] Comments:    Manual Treatments:  PROM / STM / Oscillations-Mobs:  G-I, II, III, IV (PA's, Inf., Post.)  [x] (02392) Provided manual therapy to mobilize soft tissue/joints of cervical/CT, scapular GHJ and UE for the purpose of modulating pain, promoting relaxation,  increasing ROM, reducing/eliminating soft tissue swelling/inflammation/restriction, improving soft tissue extensibility and allowing for proper ROM for normal function with self care, reaching, carrying, lifting, house/yardwork, driving/computer work  [] Comments:    ADL Training:  [] (64258) Provided self-care/home management training related to activities of daily living and compensatory training, and/or use of adaptive equipment   [] Comments:     Splinting:  [] Fabrication of:   [] (26971) Orthotic/Prosthetic Management, subsequent encounter  [] (04300) Orthotic management and training (fitting and assessment)  [] Comments:    Charges:  Timed Code Treatment Minutes: 50   Total Treatment Minutes: 60   8:50 - 9:50 am  [] EVAL (LOW) 51345   [] OT Re-eval (92575)  [] EVAL (MOD) 95259   [] EVAL (HIGH) 23134       [x] Trinity (82897) x   2 38' [] MJTIS(90260)  [] NMR (69259) x     [] Estim (attended) (42636)   [x] Manual (14340) x   1  12' [] US (81733)  [] TA (14059) x     [] Paraffin (84422)  [] ADL  (38202) x    [] Splint/L code:    [] Estim (unattended) (51111)  [x] Fluidotherapy (02252)  [] Other:    GOALS:   Short Term Goals: To be achieved in: 2 weeks  1. Independent in HEP and progression per patient tolerance, in order to prevent re-injury. 2. Patient will have a decrease in pain to facilitate improvement in movement, function, and ADLs as indicated by Functional Deficits.     Long Term Goals to be achieved in 12  weeks, including patient directed goals to address identified performance deficits:  1) Pt to be independent in graded HEP progression with a good level of effort and compliance. 2) Pt to report a score of 70 % or less on the Quick DASH disability questionnaire for increased performance with carrying, moving, and handling objects. 3) Pt will demonstrate increased ROM to thumb/digits/wrist for improved grooming/writing. 4) Pt will demonstrate increased strength to R hand for improved gardening/driving. 5) Pt will have a decrease in pain to hand to facilitate improvement in independent ADL's. Progression Towards Functional goals:  [] Patient is progressing as expected towards functional goals listed. [x] Progression is slowed due to complexities listed. [] Progression has been slowed due to co-morbidities. [] Plan just implemented, too soon to assess goals progression  [] All goals are met  [x] Other: tightness from scarring    ASSESSMENT:  Pt reported discomfort following AAROM of fingers. She is to be a little more aggressive at home using AAROM    Treatment/Activity Tolerance:  [] Patient tolerated treatment well [] Patient limited by fatique  [x] Patient limited by pain  [] Patient limited by other medical complications  [] Other:     Prognosis: [x] Good [] Fair  [] Poor    Patient Requires Follow-up: [x] Yes  [] No     PLAN: Progress per protocol. [x] Continue per plan of care [] Alter current plan (see comments)  [] Plan of care initiated [] Hold pending MD visit [] Discharge    If patient does not return for further follow ups after this date, please consider this as the patient's discharge from occupational therapy. Electronically signed by:  Elise Gracia OTR/L [address] [place] [address] [id number]

## 2019-06-26 NOTE — PROGRESS NOTES
Chief Complaint:  Follow-up (right thumb/wrist)      History of Present of Illness:  Ms. Harpreet Tejeda a 64-year-old female presenting as a repeat scheduled postoperative visit after right thumb surgery  Procedure: Right thumb tendon transfer with EIP to EPL tendon transfer Secondary to nondisplaced distal radius fracture  Date of procedure: 5/6/2019  She is now just over 7 weeks status post surgery  She has been working with therapy and improving her range of motion but does continue to have stiffness in her thumb  She is nearly 3-1/2 months status post distal radius fracture, injury 3/11/2019  No new swelling or pain, wrist has been feeling well but she does notice continued stiffness in her thumb that has been slowly but progressively improving      Examination:  General: Pleasant, well-appearing, no acute distress  Right upper extremity/right thumb and wrist  Well-healed incisions, soft and mobile scar, no erythema or additional skin changes  Appropriate tenodesis and finger cascade including thumb  With limited testing patient does demonstrate active thumb extension with limited IP motion approximately 30 degrees of flexion actively and able to extend to neutral position   There is a palpable extensor mechanism with active thumb extension  Painless wrist range of motion with no crepitus and no pain approximately 60 degrees of extension and flexion and no pain with circumduction  Gross sensation intact to median ulnar and radial nerve throughout the hand without deficit  Capillary refill brisk in all fingers  Limited wrist range of motion tested with no pain or crepitus  Nontender throughout distal radius to palpation        Radiology:     X-rays obtained and reviewed in office:  No new images obtained today    No orders of the defined types were placed in this encounter. Impression:  Encounter Diagnoses   Name Primary?     Nontraumatic rupture of tendon of right thumb Yes    Other closed intra-articular fracture of distal end of right radius, initial encounter          Treatment Plan:  Now 7 weeks status post tendon transfer the patient demonstrates intact tendon with stiffness and scarring. I think that beginning to wean from the brace over the next 1 week is appropriate and using her thumb and hand for daily functional activities without the brace will help to improve some of her motion  No lifting or gripping greater than 2 to 3 pounds at this point but will progress over the next several weeks.   Continue with edema control, scar management and motion  She is to continue with occupational therapy for guidance during this time  I will plan to see her back in approximately 3 to 4 weeks for repeat evaluation

## 2019-06-28 ENCOUNTER — HOSPITAL ENCOUNTER (OUTPATIENT)
Dept: OCCUPATIONAL THERAPY | Age: 59
Setting detail: THERAPIES SERIES
Discharge: HOME | End: 2019-06-28
Payer: COMMERCIAL

## 2019-06-28 PROCEDURE — 97110 THERAPEUTIC EXERCISES: CPT | Performed by: OCCUPATIONAL THERAPIST

## 2019-06-28 PROCEDURE — 97140 MANUAL THERAPY 1/> REGIONS: CPT | Performed by: OCCUPATIONAL THERAPIST

## 2019-06-28 PROCEDURE — 97022 WHIRLPOOL THERAPY: CPT | Performed by: OCCUPATIONAL THERAPIST

## 2019-06-28 NOTE — FLOWSHEET NOTE
Right  6/26/19  7.5 wks   IF MP  PIP  DIP    3 cm   2   LF MP  PIP  DIP    3 cm   2   RF MP  PIP   DIP    2 cm   2   SF MP  PIP  DIP    3 cm   2   TH   3.5   Digits: tips to [facility]                 Thumb MP  IP 5/20  0/5   10/40  0/20   Thumb tip to DPFC To IF tip   To side of RF   Thumb RA               PA 50      Wrist Ext/Flex            RD/UD 15/40 50/60 60/35            Edema: Min especially thumb   TH    prox phalanx 7.3  PIP 7  distal phalanx 6.5    L  6.5  6.7  5.9                     Strength: NT       II        Lateral Pinch        3 Point Pinch        Tip Pinch        MMT:                   Date:  6/12/2019 6/19/19 6/26/19 6/28/19    Objective Measures:         See above  See above                     Modalities: HP 15 min to increase tissue extensibility         fluidotherapy  10' 10' 12'                    Therapeutic Exercise, Activities, NMR:        ROM AROM to thumb/digits/wrist x 10 each AROM to thumb/digits/wrist x 10 each    Added th blocked IP flex    Added AAROM to wrist ext and th ext/abd    Th flex to foam cube  10x AAROM  Hook, composite fist, straight fist,   th IP. composite th flex, th opposition, wrist ext/flex, RD/UD    Th flex -to foam cube  10x AAROM  Hook, composite  wrist ext/flex, RD/UD   th IP. composite th flex, th opposition,    Th flex -to foam cube  10x    Function  Lg foam cubes  and hold 4 , move to radial side of hand and release 1 at a time    Rope wristciser  5x2  lg foam cubes with thumb and SF      Rope wristciser  5X2  lg foam cubes with thumb and SF    Manual Scar massage/STM to thumb and wrist Scar massage     retrograde to hand       edema glove issued to assist with hand edema STM to ext musculature,  Scar massage, retrograde massage STM to ext musculature,  Scar massage, retrograde massage     HEP as above and discussed healing/scarring/protocol        Splint fitting well-cont between exercises at all times         Therapeutic Exercise and lifting, house/yardwork, driving/computer work  [] Comments:    ADL Training:  [] (84835) Provided self-care/home management training related to activities of daily living and compensatory training, and/or use of adaptive equipment   [] Comments:     Splinting:  [] Fabrication of:   [] (28384) Orthotic/Prosthetic Management, subsequent encounter  [] (27873) Orthotic management and training (fitting and assessment)  [] Comments:    Charges:  Timed Code Treatment Minutes: 48   Total Treatment Minutes: 60   8:30 - 9:30 am  [] EVAL (LOW) 26209   [] OT Re-eval (70826)  [] EVAL (MOD) 88594   [] EVAL (HIGH) 82350       [x] Trinity (47988) x   2 38' [] IPEEA(11298)  [] NMR (47059) x     [] Estim (attended) (05723)   [x] Manual ([address]) x   1  10' [] US (93395)  [] TA () x     [] Paraffin (64012)  [] ADL  ([phone]) x    [] Splint/L code:    [] Estim (unattended) [phone]  [x] Fluidotherapy (33920)  [] Other:    GOALS:   Short Term Goals: To be achieved in: 2 weeks  1. Independent in HEP and progression per patient tolerance, in order to prevent re-injury. 2. Patient will have a decrease in pain to facilitate improvement in movement, function, and ADLs as indicated by Functional Deficits.     Long Term Goals to be achieved in 12  weeks, including patient directed goals to address identified performance deficits:  1) Pt to be independent in graded HEP progression with a good level of effort and compliance. 2) Pt to report a score of 70 % or less on the Quick DASH disability questionnaire for increased performance with carrying, moving, and handling objects. 3) Pt will demonstrate increased ROM to thumb/digits/wrist for improved grooming/writing. 4) Pt will demonstrate increased strength to R hand for improved gardening/driving. 5) Pt will have a decrease in pain to hand to facilitate improvement in independent ADL's.           Progression Towards Functional goals:  [] Patient is progressing as expected towards functional goals listed. [x] Progression is slowed due to complexities listed. [] Progression has been slowed due to co-morbidities. [] Plan just implemented, too soon to assess goals progression  [] All goals are met  [x] Other: tightness from scarring    ASSESSMENT:  Pt with increased soreness and general fatigue today. Treatment/Activity Tolerance:  [] Patient tolerated treatment well [] Patient limited by fatique  [x] Patient limited by pain  [] Patient limited by other medical complications  [] Other:     Prognosis: [x] Good [] Fair  [] Poor    Patient Requires Follow-up: [x] Yes  [] No     PLAN: Progress per protocol. [x] Continue per plan of care [] Alter current plan (see comments)  [] Plan of care initiated [] Hold pending MD visit [] Discharge    If patient does not return for further follow ups after this date, please consider this as the patient's discharge from occupational therapy. Electronically signed by:  Taylor NAYAK/DANIEL [address] [place] [address] [id number]

## 2019-07-02 ENCOUNTER — HOSPITAL ENCOUNTER (OUTPATIENT)
Dept: OCCUPATIONAL THERAPY | Age: 59
Setting detail: THERAPIES SERIES
Discharge: HOME | End: 2019-07-02
Payer: COMMERCIAL

## 2019-07-02 PROCEDURE — 97140 MANUAL THERAPY 1/> REGIONS: CPT | Performed by: OCCUPATIONAL THERAPIST

## 2019-07-02 PROCEDURE — 97022 WHIRLPOOL THERAPY: CPT | Performed by: OCCUPATIONAL THERAPIST

## 2019-07-02 PROCEDURE — 97110 THERAPEUTIC EXERCISES: CPT | Performed by: OCCUPATIONAL THERAPIST

## 2019-07-02 NOTE — FLOWSHEET NOTE
to continue with occupational therapy for guidance during this time        OBJECTIVE:     6/12/19  AROM: Right Left Right  6/26/19  7.5 wks R  7/2/19   IF MP  PIP  DIP    3 cm   2 2   LF MP  PIP  DIP    3 cm   2 1   RF MP  PIP   DIP    2 cm   2 0   SF MP  PIP  DIP    3 cm   2 0   TH   3.5 3.5   Digits: tips to [facility]                  Thumb MP  IP 5/20  0/5   10/40  0/20 5/45 /30   Thumb tip to [facility] To IF tip   To side of RF Side of SF   Thumb RA               PA 50       Wrist Ext/Flex            RD/UD 15/40 50/60 60/35 65/40             Edema: Min especially thumb   TH    prox phalanx 7.3  PIP 7  distal phalanx 6.5    L  6.5  6.7  5.9 TH      7.2  6.7  6.5                       Strength: NT        II      R 25#  L 45#   Lateral Pinch      R 10#  L 16#   3 Point Pinch      R 8#  L 15#   Tip Pinch         MMT:                    Date:  6/12/2019 6/19/19 6/26/19 6/28/19 7/2/19   Objective Measures:     8 weeks    See above  See above  See above                   Modalities: HP 15 min to increase tissue extensibility         fluidotherapy  10' 10' 12' 12'                   Therapeutic Exercise, Activities, NMR:        ROM AROM to thumb/digits/wrist x 10 each AROM to thumb/digits/wrist x 10 each    Added th blocked IP flex    Added AAROM to wrist ext and th ext/abd    Th flex to foam cube  10x AAROM  Hook, composite fist, straight fist,   th IP. composite th flex, th opposition, wrist ext/flex, RD/UD    Th flex -to foam cube  10x AAROM  Hook, composite  wrist ext/flex, RD/UD   th IP. composite th flex, th opposition,    Th flex -to foam cube  10x AAROM  Hook, composite fist, straight fist,   th IP. composite th flex, th opposition, wrist ext/flex, RD/UD    Th flex -to foam cube  15x   Function  Lg foam cubes  and hold 4 , move to radial side of hand and release 1 at a time    Rope wristciser  5x2  lg foam cubes with thumb and SF      Rope wristciser  5X2  lg foam cubes with thumb and SF  lg foam cubes with thumb placed on ulnar side of hand    Rope wristciser  10X2   Manual Scar massage/STM to thumb and wrist Scar massage     retrograde to hand       edema glove issued to assist with hand edema STM to ext musculature,  Scar massage, retrograde massage STM to ext musculature,  Scar massage, retrograde massage STM to ext musculature,  Scar massage, retrograde massage    HEP as above and discussed healing/scarring/protocol        Splint fitting well-cont between exercises at all times       Strengthening     1#   Wrist ext/flex  RD/UD  Sup/pron  10X2             Therapeutic Exercise and NMR:  [x] (77256) Provided verbal/tactile cueing for activities related to strengthening, flexibility, endurance, ROM  for improvements in scapular, scapulothoracic and UE control with self care, reaching, carrying, lifting, house/yardwork, driving/computer work. [x] (36860) Provided verbal/tactile cueing for activities related to improving balance, coordination, kinesthetic sense, posture, motor skill, proprioception  to assist with  scapular, scapulothoracic and UE control with self care, reaching, carrying, lifting, house/yardwork, driving/computer work.   [] Comments:    Therapeutic Activities:    [] (17627 or 63767) Provided verbal/tactile cueing for activities related to improving balance, coordination, kinesthetic sense, posture, motor skill, proprioception and motor activation to allow for proper function of scapular, scapulothoracic and UE control with self care, carrying, lifting, driving/computer work  [] Comments:    Home Exercise Program:    [x] (04594) Reviewed/Progressed HEP activities related to strengthening, flexibility, endurance, ROM of scapular, scapulothoracic and UE control with self care, reaching, carrying, lifting, house/yardwork, driving/computer work  [] (60124) Reviewed/Progressed HEP activities related to improving balance, coordination, kinesthetic sense, posture, motor skill,

## 2019-07-05 ENCOUNTER — HOSPITAL ENCOUNTER (OUTPATIENT)
Dept: OCCUPATIONAL THERAPY | Age: 59
Setting detail: THERAPIES SERIES
Discharge: HOME | End: 2019-07-05
Payer: COMMERCIAL

## 2019-07-09 ENCOUNTER — HOSPITAL ENCOUNTER (OUTPATIENT)
Dept: OCCUPATIONAL THERAPY | Age: 59
Setting detail: THERAPIES SERIES
Discharge: HOME | End: 2019-07-09
Payer: COMMERCIAL

## 2019-07-09 PROCEDURE — 97110 THERAPEUTIC EXERCISES: CPT | Performed by: OCCUPATIONAL THERAPIST

## 2019-07-09 PROCEDURE — 97140 MANUAL THERAPY 1/> REGIONS: CPT | Performed by: OCCUPATIONAL THERAPIST

## 2019-07-09 PROCEDURE — 97022 WHIRLPOOL THERAPY: CPT | Performed by: OCCUPATIONAL THERAPIST

## 2019-07-09 NOTE — FLOWSHEET NOTE
progress over the next several weeks.   Continue with edema control, scar management and motion  She is to continue with occupational therapy for guidance during this time        OBJECTIVE:     6/12/19  AROM: Right Left Right  6/26/19  7.5 wks R  7/2/19    IF MP  PIP  DIP    3 cm   2 2 DASH   LF MP  PIP  DIP    3 cm   2 1    RF MP  PIP   DIP    2 cm   2 0    SF MP  PIP  DIP    3 cm   2 0    TH   3.5 3.5    Digits: tips to [facility]                   Thumb MP  IP 5/20  0/5   10/40  0/20 5/45 /30    Thumb tip to DPFC To IF tip   To side of RF Side of SF    Thumb RA               PA 50        Wrist Ext/Flex            RD/UD 15/40 50/60 60/35 65/40               Edema: Min especially thumb   TH    prox phalanx 7.3  PIP 7  distal phalanx 6.5    L  6.5  6.7  5.9 TH      7.2  6.7  6.5                          Strength: NT         II      R 25#  L 45#    Lateral Pinch      R 10#  L 16#    3 Point Pinch      R 8#  L 15#    Tip Pinch          MMT:                             Date:  6/12/2019 6/19/19 6/26/19 6/28/19 7/2/19 7/9/19   Objective Measures:     8 weeks 9 wks    See above  See above  See above                      Modalities: HP 15 min to increase tissue extensibility          fluidotherapy  10' 10' 12' 12' 12'                     Therapeutic Exercise, Activities, NMR:         ROM AROM to thumb/digits/wrist x 10 each AROM to thumb/digits/wrist x 10 each    Added th blocked IP flex    Added AAROM to wrist ext and th ext/abd    Th flex to foam cube  10x AAROM  Hook, composite fist, straight fist,   th IP. composite th flex, th opposition, wrist ext/flex, RD/UD    Th flex -to foam cube  10x AAROM  Hook, composite  wrist ext/flex, RD/UD   th IP. composite th flex, th opposition,    Th flex -to foam cube  10x AAROM  Hook, composite fist, straight fist,   th IP. composite th flex, th opposition, wrist ext/flex, RD/UD    Th flex -to foam cube  15x AAROM  Hook, composite  wrist ext/flex, RD/UD   th IP. composite th flex, th

## 2019-07-10 ENCOUNTER — HOSPITAL ENCOUNTER (OUTPATIENT)
Dept: OCCUPATIONAL THERAPY | Age: 59
Setting detail: THERAPIES SERIES
Discharge: HOME | End: 2019-07-10
Payer: COMMERCIAL

## 2019-07-10 PROCEDURE — 97022 WHIRLPOOL THERAPY: CPT | Performed by: OCCUPATIONAL THERAPIST

## 2019-07-10 PROCEDURE — 97140 MANUAL THERAPY 1/> REGIONS: CPT | Performed by: OCCUPATIONAL THERAPIST

## 2019-07-10 PROCEDURE — 97110 THERAPEUTIC EXERCISES: CPT | Performed by: OCCUPATIONAL THERAPIST

## 2019-07-10 NOTE — FLOWSHEET NOTE
[facility] [employer]  [address] [place] [address]  Phone [phone]      Fax [phone]      Hand Therapy Daily Treatment Note  Date:  7/10/2019    Patient: Aurora Jane   : 1960   MRN: [id number]  Referring Physician: Referring Practitioner: Gloria Calvert MD       Medical Diagnosis Information:  Diagnosis: Diagnosis: R closed wrist fracture  S62.101A  R thumb EPL nontraumatic rupture  M66    Treatment Diagnosis: M25.641 R hand stiffness                                         Insurance information: OT Insurance Information: [facility] [employer]  Date of Injury: 3/11/19  Date of Surgery: 19 EIP to EPL transfer          Visit # Insurance Allowable    until      Date of Patient follow up with Physician: 19    Progress Note: []  Yes  [x]  No  Next due by: Visit #10      Latex Allergy:  [x]No      []Yes  Pacemaker:  [x] No       [] Yes     Preferred Language for Healthcare:   [x]English       []other:    SUBJECTIVE:   19  States she took a week off from working on hand, has been depressed; concerned about neck  19 Reports not increased pain following last session. Hypersensitivity on dorsum R thumb. Wrist remains stiff  49 Increased edema and pain following last session.   Did not exercise yesterday   Reports edema glove is really helping the swelling    Functional Disability Index: Quick DASH score 34 -52% perceived disability taken on 2019    Pain level:  2/10 Intermittent/stiffness thumb  Current: 4/10 entire hand      RESTRICTIONS/PRECAUTIONS: splint and gentle AROM out of splint only-no use   19 MD Note : I think that beginning to wean from the brace over the next 1 week is appropriate and using her thumb and hand for daily functional activities without the brace will help to improve some of her motion  No lifting or gripping greater than 2 to 3 pounds at this point but will Comments:    Charges:  Timed Code Treatment Minutes: 53   Total Treatment Minutes: 63   10:00 - 11 am  [] EVAL (LOW) 19831   [] OT Re-eval (63286)  [] EVAL (MOD) 69425   [] EVAL (HIGH) 21627       [x] Trinity (66778) x   2 45' [] EORLF(38103)  [] NMR (96114) x     [] Estim (attended) (92372)   [x] Manual (94479) x   1  8' [] ZU (11675)  [] TA (82649) x     [] Paraffin (57392)  [] ADL  (94278) x    [] Splint/L code:    [] Estim (unattended) (67503)  [x] Fluidotherapy (89475)71'  [] Other:    GOALS:   Short Term Goals: To be achieved in: 2 weeks  1. Independent in HEP and progression per patient tolerance, in order to prevent re-injury. 2. Patient will have a decrease in pain to facilitate improvement in movement, function, and ADLs as indicated by Functional Deficits.     Long Term Goals to be achieved in 12  weeks, including patient directed goals to address identified performance deficits:  1) Pt to be independent in graded HEP progression with a good level of effort and compliance. 2) Pt to report a score of 70 % or less on the Quick DASH disability questionnaire for increased performance with carrying, moving, and handling objects. 3) Pt will demonstrate increased ROM to thumb/digits/wrist for improved grooming/writing. 4) Pt will demonstrate increased strength to R hand for improved gardening/driving. 5) Pt will have a decrease in pain to hand to facilitate improvement in independent ADL's. Progression Towards Functional goals:  [] Patient is progressing as expected towards functional goals listed. [x] Progression is slowed due to complexities listed. [] Progression has been slowed due to co-morbidities. [] Plan just implemented, too soon to assess goals progression  [] All goals are met  [x] Other: neck issues    ASSESSMENT:    7/9/19 Increased wrist mobility noted by end of session  7/8/19 Pt's mobility and function cont to improve.   Pt anxious about her neck status which she will find out in a

## 2019-07-12 ENCOUNTER — APPOINTMENT (OUTPATIENT)
Dept: OCCUPATIONAL THERAPY | Age: 59
End: 2019-07-12
Payer: COMMERCIAL

## 2019-07-24 ENCOUNTER — HOSPITAL ENCOUNTER (OUTPATIENT)
Dept: OCCUPATIONAL THERAPY | Age: 59
Setting detail: THERAPIES SERIES
Discharge: HOME | End: 2019-07-24
Payer: COMMERCIAL

## 2019-07-24 ENCOUNTER — OFFICE VISIT (OUTPATIENT)
Dept: ORTHOPEDIC SURGERY | Age: 59
End: 2019-07-24

## 2019-07-24 DIAGNOSIS — M66.841 NONTRAUMATIC RUPTURE OF TENDON OF RIGHT THUMB: Primary | ICD-10-CM

## 2019-07-24 PROCEDURE — 97110 THERAPEUTIC EXERCISES: CPT | Performed by: OCCUPATIONAL THERAPIST

## 2019-07-24 PROCEDURE — 97140 MANUAL THERAPY 1/> REGIONS: CPT | Performed by: OCCUPATIONAL THERAPIST

## 2019-07-24 PROCEDURE — 97022 WHIRLPOOL THERAPY: CPT | Performed by: OCCUPATIONAL THERAPIST

## 2019-07-24 PROCEDURE — 99024 POSTOP FOLLOW-UP VISIT: CPT | Performed by: ORTHOPAEDIC SURGERY

## 2019-07-24 NOTE — FLOWSHEET NOTE
[address] [address]  Phone [phone]      Fax [phone]      Hand Therapy Daily Treatment Note  Date:  2019    Patient: Lavell Anderson   : 1960   MRN: [id number]  Referring Physician: Referring Practitioner: Abdi Rosenberg MD       Medical Diagnosis Information:  Diagnosis: Diagnosis: R closed wrist fracture  S62.101A  R thumb EPL nontraumatic rupture  M66    Treatment Diagnosis: M25.641 R hand stiffness                                         Insurance information: OT Insurance Information: [facility] Deepa Denisse  Date of Injury: 3/11/19  Date of Surgery: 19 EIP to EPL transfer          Visit # Insurance Allowable    until      Date of Patient follow up with Physician: 19    Progress Note: []  Yes  [x]  No  Next due by: Visit #10      Latex Allergy:  [x]No      []Yes  Pacemaker:  [x] No       [] Yes     Preferred Language for Healthcare:   [x]English       []other:    SUBJECTIVE:   19  Cont to have stiffness in R hand. Difficulty tuning key in car. Reports she has been cleared in regards to her neck to return to work. 19  States she took a week off from working on hand, has been depressed; concerned about neck  19 Reports not increased pain following last session. Hypersensitivity on dorsum R thumb. Wrist remains stiff  49 Increased edema and pain following last session.   Did not exercise yesterday   Reports edema glove is really helping the swelling    Functional Disability Index: Quick DASH score 22-25% perceived disability taken on 19  ( initial 2019  70%)    Pain level:  2/10 Intermittent/stiffness thumb       Current: 0-1/10 entire hand      RESTRICTIONS/PRECAUTIONS: splint and gentle AROM out of splint only-no use   19 MD Note : I think that beginning to wean from the brace over the next 1 week is appropriate and using her thumb AAROM  Hook, composite fist, straight fist,   th IP. composite th flex, th opposition, wrist ext/flex, RD/UD    Th flex -to foam cube  10x AAROM  Hook, composite  wrist ext/flex, RD/UD   th IP. composite th flex, th opposition,    Th flex -to foam cube  10x AAROM  Hook, composite fist, straight fist,   th IP. composite th flex, th opposition, wrist ext/flex, RD/UD    Th flex -to foam cube  15x AAROM  Hook, composite  wrist ext/flex, RD/UD   th IP. composite th flex, th opposition,    Th flex -to foam cube  10x    Wristciser vertical  & horiz  25X ea AAROM  Hook, composite  wrist ext/flex, RD/UD   th IP. composite th flex, th opposition,    Th flex -to foam cube  10x    Wristciser vertical  & horiz  25X ea Th flex -to foam cube  10x    AAROM  Hook, composite    Wristciser vertical  & horiz  25X ea   Function  Lg foam cubes  and hold 4 , move to radial side of hand and release 1 at a time    Rope wristciser  5x2  lg foam cubes with thumb and SF      Rope wristciser  5X2  lg foam cubes with thumb and SF  lg foam cubes with thumb placed on ulnar side of hand    Rope wristciser  10X2 Foam cube and beads -  1 at time 10 items,- move to radial side of hand and release 1 at a time     Manual Scar massage/STM to thumb and wrist Scar massage     retrograde to hand       edema glove issued to assist with hand edema STM to ext musculature,  Scar massage, retrograde massage STM to ext musculature,  Scar massage, retrograde massage STM to ext musculature,  Scar massage, retrograde massage STM to ext musculature,  Scar massage, retrograde massage STM to ext musculature,  Scar massage, retrograde massage STM to ext musculature,  Scar massage, retrograde massage    HEP as above and discussed healing/scarring/protocol           Splint fitting well-cont between exercises at all times          Strengthening     1#   Wrist ext/flex  RD/UD  Sup/pron  10X2   Red  flexbar   vert & horiz twist  pron  Sup  Jar twist  10x ea Red  flexbar   vert & horiz twist  pron  Sup  Jar twist  15x ea 2#  Wrist flex  Wrist ext  RD/UD  Sup/pron    powergrip  35#  20x2     and pegs  25x2    Gripping/pinich  Blue foam  . - foam issued for home    digiflex  Red  Gr  10x ea digit                Therapeutic Exercise and NMR:  [x] (65018) Provided verbal/tactile cueing for activities related to strengthening, flexibility, endurance, ROM  for improvements in scapular, scapulothoracic and UE control with self care, reaching, carrying, lifting, house/yardwork, driving/computer work. [x] (48484) Provided verbal/tactile cueing for activities related to improving balance, coordination, kinesthetic sense, posture, motor skill, proprioception  to assist with  scapular, scapulothoracic and UE control with self care, reaching, carrying, lifting, house/yardwork, driving/computer work.   [] Comments:    Therapeutic Activities:    [] (99661 or 19319) Provided verbal/tactile cueing for activities related to improving balance, coordination, kinesthetic sense, posture, motor skill, proprioception and motor activation to allow for proper function of scapular, scapulothoracic and UE control with self care, carrying, lifting, driving/computer work  [] Comments:    Home Exercise Program:    [x] (70613) Reviewed/Progressed HEP activities related to strengthening, flexibility, endurance, ROM of scapular, scapulothoracic and UE control with self care, reaching, carrying, lifting, house/yardwork, driving/computer work  [] (47265) Reviewed/Progressed HEP activities related to improving balance, coordination, kinesthetic sense, posture, motor skill, proprioception of scapular, scapulothoracic and UE control with self care, reaching, carrying, lifting, house/yardwork, driving/computer work    [] Comments:    Manual Treatments:  PROM / STM / Oscillations-Mobs:  G-I, II, III, IV (PA's, Inf., Post.)  [x] (10248) Provided manual therapy to mobilize soft tissue/joints of cervical/CT, scapular GHJ and UE for the purpose of modulating pain, promoting relaxation,  increasing ROM, reducing/eliminating soft tissue swelling/inflammation/restriction, improving soft tissue extensibility and allowing for proper ROM for normal function with self care, reaching, carrying, lifting, house/yardwork, driving/computer work  [] Comments:    ADL Training:  [] (95259) Provided self-care/home management training related to activities of daily living and compensatory training, and/or use of adaptive equipment   [] Comments:     Splinting:  [] Fabrication of:   [] (15395) Orthotic/Prosthetic Management, subsequent encounter  [] (24396) Orthotic management and training (fitting and assessment)  [] Comments:    Charges:  Timed Code Treatment Minutes: 50   Total Treatment Minutes: 60   8:30 -  9:30am  [] EVAL (LOW) 56580   [] OT Re-eval (97965)  [] EVAL (MOD) 22094   [] EVAL (HIGH) 73279       [x] Trinity (99463) x   2 42' [] FYIUP(36134)  [] NMR (47140) x     [] Estim (attended) (19108)   [x] Manual ([id number]) x   1  8' [] US (93570)  [] TA (25694) x     [] Paraffin (45452)  [] ADL  (57796) x    [] Splint/L code:    [] Estim (unattended) (53803)  [x] Fluidotherapy (67536)08'  [] Other:    GOALS:   Short Term Goals: To be achieved in: 2 weeks  1. Independent in HEP and progression per patient tolerance, in order to prevent re-injury. 2. Patient will have a decrease in pain to facilitate improvement in movement, function, and ADLs as indicated by Functional Deficits.     Long Term Goals to be achieved in 12  weeks, including patient directed goals to address identified performance deficits:  1) Pt to be independent in graded HEP progression with a good level of effort and compliance. 2) Pt to report a score of 70 % or less on the Quick DASH disability questionnaire for increased performance with carrying, moving, and handling objects.   3) Pt will demonstrate increased ROM to

## 2019-07-30 ENCOUNTER — HOSPITAL ENCOUNTER (OUTPATIENT)
Dept: OCCUPATIONAL THERAPY | Age: 59
Setting detail: THERAPIES SERIES
Discharge: HOME | End: 2019-07-30
Payer: COMMERCIAL

## 2019-08-01 ENCOUNTER — HOSPITAL ENCOUNTER (OUTPATIENT)
Dept: OCCUPATIONAL THERAPY | Age: 59
Setting detail: THERAPIES SERIES
Discharge: HOME | End: 2019-08-01
Payer: COMMERCIAL

## 2019-08-01 PROCEDURE — 97110 THERAPEUTIC EXERCISES: CPT | Performed by: OCCUPATIONAL THERAPIST

## 2019-08-01 PROCEDURE — 97022 WHIRLPOOL THERAPY: CPT | Performed by: OCCUPATIONAL THERAPIST

## 2019-08-01 NOTE — FLOWSHEET NOTE
use   6/26/19 MD Note : I think that beginning to wean from the brace over the next 1 week is appropriate and using her thumb and hand for daily functional activities without the brace will help to improve some of her motion  No lifting or gripping greater than 2 to 3 pounds at this point but will progress over the next several weeks.   Continue with edema control, scar management and motion  She is to continue with occupational therapy for guidance during this time        OBJECTIVE:     6/12/19  AROM: Right Left R  7/2/19 7/9/19 7/23/19    IF MP  PIP  DIP    3 cm   2 1.5   1    LF MP  PIP  DIP    3 cm   1 2 1.5    RF MP  PIP   DIP    2 cm   0      SF MP  PIP  DIP    3 cm   0      TH   3.5 3.2 2.5    Digits: tips to [facility]                    Thumb MP  IP 5/20  0/5   5/45  /30 0/55  /40 /55  /45    Thumb tip to [facility] To IF tip   Side of SF Side of SF Side of SF    Thumb RA               PA 50         Wrist Ext/Flex            RD/UD 15/40 50/60 65/40 60/35 67/45                Edema: Min especially thumb   TH      7.2  6.7  6.5                              Strength: NT          II     R 25#  L 45# 25# 35#    Lateral Pinch     R 10#  L 16# 10# 14#    3 Point Pinch     R 8#  L 15# 7# 11#    Tip Pinch           MMT:    wrist ext  Wrist flex          4/5  4/5    Coordination  Francisco Javier targeted coin test     R  Sec 28  Dropped 3  Score 43     L  25 sec      Dropped 2  Score 35                Date:  6/12/2019 7/2/19 7/9/19 7/10/19 7/23/19 8/1/19   Objective Measures:  8 weeks 9 wks       See above See above                         Modalities: HP 15 min to increase tissue extensibility          fluidotherapy  12' 12' 12 12 6' with th passive flex    6'                          Therapeutic Exercise, Activities, NMR:         ROM AROM to thumb/digits/wrist x 10 each AAROM  Hook, composite fist, straight fist,   th IP. composite th flex, th opposition, wrist ext/flex, RD/UD    Th flex -to foam cube  15x Alonza Cuff, composite  wrist ext/flex, RD/UD   th IP. composite th flex, th opposition,    Th flex -to foam cube  10x    Wristciser vertical  & horiz  25X ea AAROM  Hook, composite  wrist ext/flex, RD/UD   th IP. composite th flex, th opposition,    Th flex -to foam cube  10x    Wristciser vertical  & horiz  25X ea Th flex -to foam cube  10x    AAROM  Hook, composite    Wristciser vertical  & horiz  25X ea Th flex -to foam cube  10x   Function   lg foam cubes with thumb placed on ulnar side of hand    Rope wristciser  10X2 Foam cube and beads -  1 at time 10 items,- move to radial side of hand and release 1 at a time   beads -  1 at time 10 items,- move to radial side of hand and release 1 at a time up    Manual Scar massage/STM to thumb and wrist STM to ext musculature,  Scar massage, retrograde massage STM to ext musculature,  Scar massage, retrograde massage STM to ext musculature,  Scar massage, retrograde massage STM to ext musculature,  Scar massage, retrograde massage     HEP as above and discussed healing/scarring/protocol         Splint fitting well-cont between exercises at all times        Strengthening  1#   Wrist ext/flex  RD/UD  Sup/pron  10X2   Red  flexbar   vert & horiz twist  pron  Sup  Jar twist  10x ea Red  flexbar   vert & horiz twist  pron  Sup  Jar twist  15x ea 2#  Wrist flex  Wrist ext  RD/UD  Sup/pron    powergrip  35#  20x2     and pegs  25x2    Gripping/pinich  Blue foam  . - foam issued for home    digiflex  Red  Gr  10x ea digit 3#  Wrist flex  Wrist ext  RD/UD  Sup/pron  Elbow flex at side  Elbow ext  Overhead  10X2    puttyciser  Red putty  lg knob  Cap   Key  10X ea direction              Therapeutic Exercise and NMR:  [x] (56064) Provided verbal/tactile cueing for activities related to strengthening, flexibility, endurance, ROM  for improvements in scapular, scapulothoracic and UE control with self care, reaching, carrying, lifting, house/yardwork, driving/computer

## (undated) DEVICE — STANDARD HYPODERMIC NEEDLE,POLYPROPYLENE HUB: Brand: MONOJECT

## (undated) DEVICE — SLING ARM L L17 1/2IN D8.5IN COT POLY DLX W/ SHLDR PD

## (undated) DEVICE — STRIP,CLOSURE,WOUND,MEDI-STRIP,1/2X4: Brand: MEDLINE

## (undated) DEVICE — PRE OP PACK: Brand: MEDLINE INDUSTRIES, INC.

## (undated) DEVICE — STOCKINETTE,DOUBLE PLY,6X48,STERILE: Brand: MEDLINE

## (undated) DEVICE — BLADE OPHTH 180DEG CUT SURF BLU STR SHRP DBL BVL GRINDLESS

## (undated) DEVICE — SUTURE FIBERWIRE SZ 2 W/ TAPERED NEEDLE BLUE L38IN NONABSORB BLU L26.5MM 1/2 CIRCLE AR7200

## (undated) DEVICE — SUTURE PDS II SZ 5-0 L30IN ABSRB VLT RB-2 L13MM 1/2 CIR Z148H

## (undated) DEVICE — SUTURE ETHLN SZ 5-0 L18IN NONABSORBABLE BLK L19MM PS-2 3/8 1666G

## (undated) DEVICE — SUTURE PROL SZ 6-0 L18IN NONABSORBABLE BLU L13MM C-1 3/8 8718H

## (undated) DEVICE — PADDING CAST W4INXL4YD HIGHLY ABSRB THAN COT EZ APPL

## (undated) DEVICE — SOLUTION IV 1000ML 0.9% SOD CHL

## (undated) DEVICE — DRAPE SURGICAL HAND PROX AURORA

## (undated) DEVICE — SUTURE NONABSORBABLE MONOFILAMENT 4-0 PS-2 18 IN BLK ETHILON 1667G

## (undated) DEVICE — SYRINGE MED 10ML LUERLOCK TIP W/O SFTY DISP

## (undated) DEVICE — SUTURE ETHLN SZ 4-0 L18IN NONABSORBABLE BLK L19MM PS-2 3/8 1667H

## (undated) DEVICE — CABLE BPLR L12FT FLYING LD DISPOSABLE

## (undated) DEVICE — 1010 S-DRAPE TOWEL DRAPE 10/BX: Brand: STERI-DRAPE™

## (undated) DEVICE — GLOVE SURG SZ 75 L12IN FNGR THK13MIL BRN LTX SYN POLYMER W

## (undated) DEVICE — PODIATRY PK

## (undated) DEVICE — UNDERGLOVE SURG SZ 8 BLU LTX FREE SYN POLYISOPRENE POLYMER

## (undated) DEVICE — Device

## (undated) DEVICE — CAUTERY ES L0.5IN FN TIP HI TEMP ACCU-TEMP

## (undated) DEVICE — SUTURE NONABSORBABLE MONOFILAMENT 5-0 C-1 1X24 IN PROLENE 8725H

## (undated) DEVICE — TURNOVER KIT RM INF CTRL TECH

## (undated) DEVICE — SUTURE PDS II SZ 4-0 L27IN ABSRB VLT L17MM RB-1 1/2 CIR Z304H

## (undated) DEVICE — GLOVE SURG SZ 8 L12IN FNGR THK79MIL GRN LTX FREE

## (undated) DEVICE — INTENDED FOR TISSUE SEPARATION, AND OTHER PROCEDURES THAT REQUIRE A SHARP SURGICAL BLADE TO PUNCTURE OR CUT.: Brand: BARD-PARKER ® CARBON RIB-BACK BLADES

## (undated) DEVICE — CATHETER IV 18 GAX125 IN WNG INTROCAN SAFETY

## (undated) DEVICE — 3M™ COBAN™ NL STERILE NON-LATEX SELF-ADHERENT WRAP, 2084S, 4 IN X 5 YD (10 CM X 4,5 M), 18 ROLLS/CASE: Brand: 3M™ COBAN™

## (undated) DEVICE — GOWN,SIRUS,POLYRNF,BRTHSLV,XLN/XL,20/CS: Brand: MEDLINE

## (undated) DEVICE — CHLORAPREP 26ML ORANGE

## (undated) DEVICE — ZIMMER® STERILE DISPOSABLE TOURNIQUET CUFF WITH PLC, DUAL PORT, SINGLE BLADDER, 18 IN. (46 CM)

## (undated) DEVICE — TUBING SUCT 10FR MAL ALUM SHFT FN CAP VENT UNIV CONN W/ OBT

## (undated) DEVICE — BANDAGE COMPR W1INXL5YD BGE E ADH TENSOPLAST

## (undated) DEVICE — SUTURE FIBERWIRE 3-0 L18IN NONABSORBABLE BLU L26.2MM 3/8 AR7225

## (undated) DEVICE — GARMENT,MEDLINE,DVT,INT,CALF,MED, GEN2: Brand: MEDLINE

## (undated) DEVICE — PADDING CAST CRIMPED FINISH STD 4 YDX3 IN COTTON WBRL II

## (undated) DEVICE — DRAPE 70X60IN SPLIT IMPERV ADHES STRIP